# Patient Record
Sex: FEMALE | Race: WHITE | NOT HISPANIC OR LATINO
[De-identification: names, ages, dates, MRNs, and addresses within clinical notes are randomized per-mention and may not be internally consistent; named-entity substitution may affect disease eponyms.]

---

## 2018-01-19 ENCOUNTER — APPOINTMENT (OUTPATIENT)
Dept: OBGYN | Facility: CLINIC | Age: 33
End: 2018-01-19
Payer: COMMERCIAL

## 2018-01-19 VITALS
DIASTOLIC BLOOD PRESSURE: 60 MMHG | BODY MASS INDEX: 38.58 KG/M2 | SYSTOLIC BLOOD PRESSURE: 120 MMHG | HEIGHT: 64 IN | WEIGHT: 226 LBS

## 2018-01-19 DIAGNOSIS — Z30.09 ENCOUNTER FOR OTHER GENERAL COUNSELING AND ADVICE ON CONTRACEPTION: ICD-10-CM

## 2018-01-19 PROCEDURE — 99395 PREV VISIT EST AGE 18-39: CPT

## 2018-01-22 LAB
C TRACH RRNA SPEC QL NAA+PROBE: NOT DETECTED
N GONORRHOEA RRNA SPEC QL NAA+PROBE: NOT DETECTED
SOURCE AMPLIFICATION: NORMAL

## 2018-03-13 ENCOUNTER — RX RENEWAL (OUTPATIENT)
Age: 33
End: 2018-03-13

## 2018-03-13 ENCOUNTER — APPOINTMENT (OUTPATIENT)
Dept: GASTROENTEROLOGY | Facility: CLINIC | Age: 33
End: 2018-03-13
Payer: COMMERCIAL

## 2018-03-13 VITALS
HEIGHT: 64 IN | DIASTOLIC BLOOD PRESSURE: 75 MMHG | RESPIRATION RATE: 17 BRPM | TEMPERATURE: 98.1 F | OXYGEN SATURATION: 100 % | BODY MASS INDEX: 37.56 KG/M2 | WEIGHT: 220 LBS | SYSTOLIC BLOOD PRESSURE: 120 MMHG

## 2018-03-13 DIAGNOSIS — F15.90 OTHER STIMULANT USE, UNSPECIFIED, UNCOMPLICATED: ICD-10-CM

## 2018-03-13 DIAGNOSIS — Z80.0 FAMILY HISTORY OF MALIGNANT NEOPLASM OF DIGESTIVE ORGANS: ICD-10-CM

## 2018-03-13 DIAGNOSIS — K62.5 HEMORRHAGE OF ANUS AND RECTUM: ICD-10-CM

## 2018-03-13 DIAGNOSIS — Z78.9 OTHER SPECIFIED HEALTH STATUS: ICD-10-CM

## 2018-03-13 DIAGNOSIS — Z12.11 ENCOUNTER FOR SCREENING FOR MALIGNANT NEOPLASM OF COLON: ICD-10-CM

## 2018-03-13 PROCEDURE — 99244 OFF/OP CNSLTJ NEW/EST MOD 40: CPT

## 2018-04-04 ENCOUNTER — CHART COPY (OUTPATIENT)
Age: 33
End: 2018-04-04

## 2018-04-11 ENCOUNTER — APPOINTMENT (OUTPATIENT)
Dept: GASTROENTEROLOGY | Facility: AMBULATORY MEDICAL SERVICES | Age: 33
End: 2018-04-11
Payer: COMMERCIAL

## 2018-04-11 PROCEDURE — 43239 EGD BIOPSY SINGLE/MULTIPLE: CPT | Mod: 59

## 2018-04-11 PROCEDURE — 45380 COLONOSCOPY AND BIOPSY: CPT

## 2018-04-24 ENCOUNTER — APPOINTMENT (OUTPATIENT)
Dept: GASTROENTEROLOGY | Facility: CLINIC | Age: 33
End: 2018-04-24
Payer: COMMERCIAL

## 2018-04-24 VITALS
HEART RATE: 68 BPM | SYSTOLIC BLOOD PRESSURE: 120 MMHG | DIASTOLIC BLOOD PRESSURE: 70 MMHG | HEIGHT: 64 IN | TEMPERATURE: 98.7 F | BODY MASS INDEX: 38.41 KG/M2 | WEIGHT: 225 LBS | OXYGEN SATURATION: 99 %

## 2018-04-24 DIAGNOSIS — R10.9 UNSPECIFIED ABDOMINAL PAIN: ICD-10-CM

## 2018-04-24 DIAGNOSIS — K64.9 UNSPECIFIED HEMORRHOIDS: ICD-10-CM

## 2018-04-24 PROCEDURE — 99214 OFFICE O/P EST MOD 30 MIN: CPT

## 2018-04-24 RX ORDER — SODIUM SULFATE, POTASSIUM SULFATE, MAGNESIUM SULFATE 17.5; 3.13; 1.6 G/ML; G/ML; G/ML
17.5-3.13-1.6 SOLUTION, CONCENTRATE ORAL
Qty: 1 | Refills: 0 | Status: DISCONTINUED | COMMUNITY
Start: 2018-03-13 | End: 2018-04-24

## 2018-06-15 ENCOUNTER — APPOINTMENT (OUTPATIENT)
Dept: OBGYN | Facility: CLINIC | Age: 33
End: 2018-06-15
Payer: COMMERCIAL

## 2018-06-15 VITALS
SYSTOLIC BLOOD PRESSURE: 130 MMHG | DIASTOLIC BLOOD PRESSURE: 70 MMHG | BODY MASS INDEX: 40.12 KG/M2 | WEIGHT: 235 LBS | HEIGHT: 64 IN

## 2018-06-15 DIAGNOSIS — Z30.431 ENCOUNTER FOR ROUTINE CHECKING OF INTRAUTERINE CONTRACEPTIVE DEVICE: ICD-10-CM

## 2018-06-15 PROCEDURE — 58301 REMOVE INTRAUTERINE DEVICE: CPT

## 2018-06-15 PROCEDURE — 99213 OFFICE O/P EST LOW 20 MIN: CPT | Mod: 25

## 2018-06-21 LAB — HBA1C MFR BLD HPLC: 5.4 %

## 2019-05-03 ENCOUNTER — APPOINTMENT (OUTPATIENT)
Dept: OBGYN | Facility: CLINIC | Age: 34
End: 2019-05-03
Payer: COMMERCIAL

## 2019-05-03 ENCOUNTER — APPOINTMENT (OUTPATIENT)
Dept: OBGYN | Facility: CLINIC | Age: 34
End: 2019-05-03

## 2019-05-03 VITALS
WEIGHT: 233 LBS | BODY MASS INDEX: 39.78 KG/M2 | DIASTOLIC BLOOD PRESSURE: 70 MMHG | SYSTOLIC BLOOD PRESSURE: 124 MMHG | HEIGHT: 64 IN

## 2019-05-03 DIAGNOSIS — Z30.432 ENCOUNTER FOR REMOVAL OF INTRAUTERINE CONTRACEPTIVE DEVICE: ICD-10-CM

## 2019-05-03 PROCEDURE — 99395 PREV VISIT EST AGE 18-39: CPT

## 2019-05-03 RX ORDER — HYDROCORTISONE ACETATE 25 MG/1
25 SUPPOSITORY RECTAL TWICE DAILY
Qty: 28 | Refills: 2 | Status: DISCONTINUED | COMMUNITY
Start: 2018-04-24 | End: 2019-05-03

## 2019-05-03 RX ORDER — BISMUTH SUBSALICYLATE 525 MG/1
TABLET ORAL
Qty: 30 | Refills: 0 | Status: DISCONTINUED | OUTPATIENT
Start: 2018-04-24 | End: 2019-05-03

## 2019-05-03 RX ORDER — NORETHINDRONE ACETATE AND ETHINYL ESTRADIOL AND FERROUS FUMARATE 1MG-20(21)
1-20 KIT ORAL DAILY
Qty: 1 | Refills: 5 | Status: DISCONTINUED | COMMUNITY
Start: 2018-12-04 | End: 2019-05-03

## 2019-05-03 RX ORDER — LEVONORGESTREL 13.5 MG/1
INTRAUTERINE DEVICE INTRAUTERINE
Refills: 0 | Status: DISCONTINUED | COMMUNITY
End: 2019-05-03

## 2019-05-03 NOTE — PHYSICAL EXAM
[LAD] : no lymphadenopathy [Acute Distress] : no acute distress [Thyroid Nodule] : no thyroid nodule [Goiter] : no goiter [Nipple Discharge] : no nipple discharge [Mass] : no breast mass [Distended] : not distended [Axillary LAD] : no axillary lymphadenopathy [Tender] : non tender [H/Smegaly] : no hepatosplenomegaly [Depressed Mood] : not depressed [Flat Affect] : affect not flat [Discharge] : had no discharge [Pap Obtained] : a Pap smear was not performed [Motion Tenderness] : there was no cervical motion tenderness [Tenderness] : nontender [Enlarged ___ wks] : not enlarged [Mass ___ cm] : no uterine mass was palpated [FreeTextEntry7] : difficult examination due to elevated BMI

## 2019-05-03 NOTE — OB HISTORY
[___] : no pregnancy complications reported [FreeTextEntry1] : Delivery of a viable female infant . Patient had C/S for maternal temp and failure to descend. \par\par ARRON RENDON MD; Sep 17 2015  7:15PM \par \par \par 29-year-old  EDC 9/12/15\par 40-5 weeks\par \par LMP 12/6/14 EDC 9/12/15\par 1/29/15 7-1/7 9/16/15\par 3/5/15 12-6/7 9/11/15\par \par Prenatal Issues\par •	[x] Pertussis vaccination - July 2, 2015\par •	History of asthma\par •	PCN allergy\par •	RhoGAM candidate 6/18/15\par •	Gestational diabetes A1\par Prenatal Labs\par •	TSH 0.61\par •	HIV (-)\par •	Rubella Immune\par •	Varicella Immune\par •	HBSAg (-)\par •	Blood type A (-)\par •	RPR (-)\par •	Electrophoresis (-)\par •	G/C (-) July 2014\par •	Pap (-) July 2013\par •	GBS (-)\par •	Ultrasound\par o	3/23/15: Cervix long and closed\par o	6/18/15: EFW 1146 g, 2 lbs. 8 oz., 56 percentile\par o	8/4/15: EFW 2585 gm; 5-11 lbs; 73%\par •	3rd trimester labs\par o	GTT 72/201/160/123\par o	\par o	HIV(-)\par o	CBC 15.2/11.2/35.4/337\par \par •	Genetic Screening\par o	CF (-)\par o	Fragile X (-)\par o	SMA (-)\par o	Ultrascreen negative> modified sequential negative\par o	Anatomy scan center, cervix long, anatomy normal\par \par \par

## 2019-05-03 NOTE — HISTORY OF PRESENT ILLNESS
[de-identified] : January 2018 [Dull] : no dull [Continuous] : no continuous [Sharp] : no sharp [Stabbing] : no stabbing [Throbbing] : no throbbing [Itching] : no itching [Burning] : no burning [Mass] : no mass [Stinging] : no stinging [Soreness] : no soreness [Lesion] : no lesion [Discharge] : no discharge [Irregular Menses] : normal menses [Prolonged Menses] : menses of normal duration [Mass (___cm)] : no palpable mass [Localized Pain] : no localized pain [Nipple Discharge] : no nipple discharge [Skin Color Change] : no skin color change [Diffused Pain] : no diffused pain [Hot Flashes] : no hot flashes [FreeTextEntry9] : Patient denies breast issues [Vaginal Itching] : no vaginal itching [Night Sweats] : no night sweats [Dyspareunia] : no dyspareunia [Mood Changes] : no mood changes [FreeTextEntry8] : Patient is not menopausal [Spotting Between  Menses] : no spotting between menses [Currently In Menopause] : not currently in menopause [Menstrual Cramps] : no menstrual cramps [Experiencing Menopausal Sxs] : not experiencing menopausal symptoms [de-identified] : Same partner x 2009

## 2019-05-03 NOTE — CHIEF COMPLAINT
[FreeTextEntry1] : \par \par This patient was last seen in June 2018\par Issues discussed\par IUD removal\par VINNIE IUD removed without complication\par Patient to return in 6 months for annual examination\par Patient planning pregnancy within the next 12 months\par She has decided utilize condoms for now\par Return to office if she desires other contraceptive methods\par \par Check Hemoglobin A1c - History of gestational diabetes \par \par \par Health data\par Pap smear negative September 2016\par HPV DNA negative September 2016\par Gonorrhea and Chlamydia negative January 2018

## 2019-05-09 LAB
C TRACH RRNA SPEC QL NAA+PROBE: NOT DETECTED
CYTOLOGY CVX/VAG DOC THIN PREP: NORMAL
HPV HIGH+LOW RISK DNA PNL CVX: NOT DETECTED
SOURCE AMPLIFICATION: NORMAL

## 2020-06-10 ENCOUNTER — NON-APPOINTMENT (OUTPATIENT)
Age: 35
End: 2020-06-10

## 2020-06-10 ENCOUNTER — APPOINTMENT (OUTPATIENT)
Dept: INTERNAL MEDICINE | Facility: CLINIC | Age: 35
End: 2020-06-10
Payer: COMMERCIAL

## 2020-06-10 VITALS
HEART RATE: 84 BPM | BODY MASS INDEX: 36.7 KG/M2 | TEMPERATURE: 97.7 F | WEIGHT: 215 LBS | SYSTOLIC BLOOD PRESSURE: 124 MMHG | DIASTOLIC BLOOD PRESSURE: 81 MMHG | HEIGHT: 64 IN

## 2020-06-10 DIAGNOSIS — Z11.59 ENCOUNTER FOR SCREENING FOR OTHER VIRAL DISEASES: ICD-10-CM

## 2020-06-10 PROCEDURE — 99385 PREV VISIT NEW AGE 18-39: CPT | Mod: 25

## 2020-06-10 PROCEDURE — 36415 COLL VENOUS BLD VENIPUNCTURE: CPT

## 2020-06-10 PROCEDURE — 81003 URINALYSIS AUTO W/O SCOPE: CPT | Mod: QW

## 2020-06-10 PROCEDURE — 93000 ELECTROCARDIOGRAM COMPLETE: CPT

## 2020-06-10 NOTE — PHYSICAL EXAM
[Normal] : normal rate, regular rhythm, normal S1 and S2 and no murmur heard [Pedal Pulses Present] : the pedal pulses are present [No Edema] : there was no peripheral edema [Soft] : abdomen soft [Non Tender] : non-tender [Non-distended] : non-distended [Normal Posterior Cervical Nodes] : no posterior cervical lymphadenopathy [Normal Anterior Cervical Nodes] : no anterior cervical lymphadenopathy [No Joint Swelling] : no joint swelling [Grossly Normal Strength/Tone] : grossly normal strength/tone [No Rash] : no rash [Coordination Grossly Intact] : coordination grossly intact [No Focal Deficits] : no focal deficits [Normal Gait] : normal gait [Normal Affect] : the affect was normal [Normal Mood] : the mood was normal

## 2020-06-10 NOTE — HEALTH RISK ASSESSMENT
[Good] : ~his/her~  mood as  good [] : No [Yes] : Yes [No] : In the past 12 months have you used drugs other than those required for medical reasons? No [0] : 2) Feeling down, depressed, or hopeless: Not at all (0) [de-identified] : social [de-identified] : intermittent.  has small child at home that comes her busy [de-identified] : norml balanced diet  [[id number]] : 0 [Patient reported PAP Smear was normal] : Patient reported PAP Smear was normal [Change in mental status noted] : No change in mental status noted [With Family] : lives with family [# of Members in Household ___] :  household currently consist of [unfilled] member(s) [] :  [Sexually Active] : sexually active [# Of Children ___] : has [unfilled] children [High Risk Behavior] : no high risk behavior [Feels Safe at Home] : Feels safe at home [Fully functional (bathing, dressing, toileting, transferring, walking, feeding)] : Fully functional (bathing, dressing, toileting, transferring, walking, feeding) [Fully functional (using the telephone, shopping, preparing meals, housekeeping, doing laundry, using] : Fully functional and needs no help or supervision to perform IADLs (using the telephone, shopping, preparing meals, housekeeping, doing laundry, using transportation, managing medications and managing finances) [Reports changes in hearing] : Reports no changes in hearing [Reports changes in vision] : Reports no changes in vision [Reports changes in dental health] : Reports no changes in dental health [Smoke Detector] : smoke detector [Carbon Monoxide Detector] : carbon monoxide detector [PapSmearDate] : 2018

## 2020-06-10 NOTE — COUNSELING
[Encouraged to increase physical activity] : Encouraged to increase physical activity [Encouraged to use exercise tracking device] : Encouraged to use exercise tracking device [Decrease Portions] : decrease portions [____ min/wk Activity] : [unfilled] min/wk activity [Good understanding] : Patient has a good understanding of disease, goals and obesity follow-up plan

## 2020-06-10 NOTE — HEALTH RISK ASSESSMENT
[Good] : ~his/her~  mood as  good [] : No [Yes] : Yes [No] : In the past 12 months have you used drugs other than those required for medical reasons? No [0] : 2) Feeling down, depressed, or hopeless: Not at all (0) [de-identified] : social [de-identified] : intermittent.  has small child at home that comes her busy [de-identified] : norml balanced diet  [[id number]] : 0 [Patient reported PAP Smear was normal] : Patient reported PAP Smear was normal [Change in mental status noted] : No change in mental status noted [With Family] : lives with family [# of Members in Household ___] :  household currently consist of [unfilled] member(s) [] :  [# Of Children ___] : has [unfilled] children [Sexually Active] : sexually active [High Risk Behavior] : no high risk behavior [Feels Safe at Home] : Feels safe at home [Fully functional (bathing, dressing, toileting, transferring, walking, feeding)] : Fully functional (bathing, dressing, toileting, transferring, walking, feeding) [Fully functional (using the telephone, shopping, preparing meals, housekeeping, doing laundry, using] : Fully functional and needs no help or supervision to perform IADLs (using the telephone, shopping, preparing meals, housekeeping, doing laundry, using transportation, managing medications and managing finances) [Reports changes in hearing] : Reports no changes in hearing [Reports changes in vision] : Reports no changes in vision [Reports changes in dental health] : Reports no changes in dental health [Smoke Detector] : smoke detector [Carbon Monoxide Detector] : carbon monoxide detector [PapSmearDate] : 2018

## 2020-06-10 NOTE — HISTORY OF PRESENT ILLNESS
[de-identified] : 33 y/o female with h/o asthma presents for annual exam states for the last 2 weeks has been coughing intermittently.  no sputum production, sob, chest pain or palpitations.states maybe due to wearing mask at work all day as she is concerned given her boss was hospitalized with covi19 last month.  her asthma is usually controlled with advair and proair, but has ran out of the medication.  usually gets these symptoms around the change of seasons.  was seen by allergist in the past but never did testing at that time.\par \par \par

## 2020-06-10 NOTE — ASSESSMENT
[FreeTextEntry1] : \par Annual \par -Advised to get yearly Flu shot \par -Advised Yearly Eye exam with Ophthalmologist\par -Advised Yearly Dental exam\par -Educated of the importance of Healthy diet, such as Mediterranean Diet and Exercise, such as walking >20 minutes a day and increasing gradually as tolerated\par \par coughing x 2weeks,  intermittently.  no sputum production, sob, chest pain or palpitations.states maybe due to wearing mask at work all day as she is concerned given her boss was hospitalized with covi19 last month.  her asthma is usually controlled with advair and proair, but has ran out of the medication.  usually gets these symptoms around the change of seasons.  was seen by allergist in the past but never did testing at that time.\par -refill advair and proair\par -add singulair \par -f/u as needed \par

## 2020-06-10 NOTE — HISTORY OF PRESENT ILLNESS
[de-identified] : 33 y/o female with h/o asthma presents for annual exam states for the last 2 weeks has been coughing intermittently.  no sputum production, sob, chest pain or palpitations.states maybe due to wearing mask at work all day as she is concerned given her boss was hospitalized with covi19 last month.  her asthma is usually controlled with advair and proair, but has ran out of the medication.  usually gets these symptoms around the change of seasons.  was seen by allergist in the past but never did testing at that time.\par \par \par

## 2020-06-10 NOTE — PHYSICAL EXAM
[Normal] : normal rate, regular rhythm, normal S1 and S2 and no murmur heard [Pedal Pulses Present] : the pedal pulses are present [No Edema] : there was no peripheral edema [Soft] : abdomen soft [Non Tender] : non-tender [Non-distended] : non-distended [Normal Posterior Cervical Nodes] : no posterior cervical lymphadenopathy [Normal Anterior Cervical Nodes] : no anterior cervical lymphadenopathy [No Joint Swelling] : no joint swelling [Grossly Normal Strength/Tone] : grossly normal strength/tone [No Rash] : no rash [Coordination Grossly Intact] : coordination grossly intact [No Focal Deficits] : no focal deficits [Normal Gait] : normal gait [Normal Mood] : the mood was normal [Normal Affect] : the affect was normal

## 2020-06-12 LAB
25(OH)D3 SERPL-MCNC: 29.5 NG/ML
ALBUMIN SERPL ELPH-MCNC: 4.5 G/DL
ALP BLD-CCNC: 55 U/L
ALT SERPL-CCNC: 11 U/L
ANION GAP SERPL CALC-SCNC: 11 MMOL/L
AST SERPL-CCNC: 14 U/L
BASOPHILS # BLD AUTO: 0.07 K/UL
BASOPHILS NFR BLD AUTO: 0.9 %
BILIRUB DIRECT SERPL-MCNC: 0.1 MG/DL
BILIRUB INDIRECT SERPL-MCNC: 0.2 MG/DL
BILIRUB SERPL-MCNC: 0.3 MG/DL
BILIRUB UR QL STRIP: NORMAL
BUN SERPL-MCNC: 10 MG/DL
CALCIUM SERPL-MCNC: 9.4 MG/DL
CHLORIDE SERPL-SCNC: 104 MMOL/L
CHOLEST SERPL-MCNC: 192 MG/DL
CHOLEST/HDLC SERPL: 4.1 RATIO
CLARITY UR: CLEAR
CO2 SERPL-SCNC: 26 MMOL/L
COLLECTION METHOD: NORMAL
CREAT SERPL-MCNC: 0.72 MG/DL
EOSINOPHIL # BLD AUTO: 0.09 K/UL
EOSINOPHIL NFR BLD AUTO: 1.1 %
ESTIMATED AVERAGE GLUCOSE: 105 MG/DL
FERRITIN SERPL-MCNC: 39 NG/ML
FOLATE SERPL-MCNC: 6.8 NG/ML
GLUCOSE SERPL-MCNC: 108 MG/DL
GLUCOSE UR-MCNC: NORMAL
HBA1C MFR BLD HPLC: 5.3 %
HCG UR QL: 0.2 EU/DL
HCT VFR BLD CALC: 43.5 %
HDLC SERPL-MCNC: 47 MG/DL
HGB BLD-MCNC: 13.5 G/DL
HGB UR QL STRIP.AUTO: NORMAL
IMM GRANULOCYTES NFR BLD AUTO: 0.6 %
IRON SATN MFR SERPL: 28 %
IRON SERPL-MCNC: 110 UG/DL
KETONES UR-MCNC: NORMAL
LDLC SERPL CALC-MCNC: 129 MG/DL
LEUKOCYTE ESTERASE UR QL STRIP: NORMAL
LYMPHOCYTES # BLD AUTO: 2.17 K/UL
LYMPHOCYTES NFR BLD AUTO: 27.5 %
MAN DIFF?: NORMAL
MCHC RBC-ENTMCNC: 27.2 PG
MCHC RBC-ENTMCNC: 31 GM/DL
MCV RBC AUTO: 87.5 FL
MONOCYTES # BLD AUTO: 0.51 K/UL
MONOCYTES NFR BLD AUTO: 6.5 %
NEUTROPHILS # BLD AUTO: 5 K/UL
NEUTROPHILS NFR BLD AUTO: 63.4 %
NITRITE UR QL STRIP: NORMAL
PH UR STRIP: 7.5
PLATELET # BLD AUTO: 326 K/UL
POTASSIUM SERPL-SCNC: 4.8 MMOL/L
PROT SERPL-MCNC: 7.3 G/DL
PROT UR STRIP-MCNC: NORMAL
RBC # BLD: 4.97 M/UL
RBC # FLD: 14.1 %
SARS-COV-2 IGG SERPL IA-ACNC: <3.8 AU/ML
SARS-COV-2 IGG SERPL QL IA: NEGATIVE
SODIUM SERPL-SCNC: 141 MMOL/L
SP GR UR STRIP: 1.02
TIBC SERPL-MCNC: 389 UG/DL
TRIGL SERPL-MCNC: 84 MG/DL
TSH SERPL-ACNC: 1.04 UIU/ML
UIBC SERPL-MCNC: 279 UG/DL
VIT B12 SERPL-MCNC: 535 PG/ML
WBC # FLD AUTO: 7.89 K/UL

## 2020-06-26 ENCOUNTER — NON-APPOINTMENT (OUTPATIENT)
Age: 35
End: 2020-06-26

## 2020-08-31 ENCOUNTER — RX RENEWAL (OUTPATIENT)
Age: 35
End: 2020-08-31

## 2020-09-03 ENCOUNTER — LABORATORY RESULT (OUTPATIENT)
Age: 35
End: 2020-09-03

## 2020-09-03 ENCOUNTER — APPOINTMENT (OUTPATIENT)
Dept: OBGYN | Facility: CLINIC | Age: 35
End: 2020-09-03
Payer: COMMERCIAL

## 2020-09-03 VITALS
BODY MASS INDEX: 38.76 KG/M2 | DIASTOLIC BLOOD PRESSURE: 80 MMHG | HEIGHT: 64 IN | SYSTOLIC BLOOD PRESSURE: 122 MMHG | WEIGHT: 227 LBS

## 2020-09-03 DIAGNOSIS — Z01.419 ENCOUNTER FOR GYNECOLOGICAL EXAMINATION (GENERAL) (ROUTINE) W/OUT ABNORMAL FINDINGS: ICD-10-CM

## 2020-09-03 DIAGNOSIS — Z30.09 ENCOUNTER FOR OTHER GENERAL COUNSELING AND ADVICE ON CONTRACEPTION: ICD-10-CM

## 2020-09-03 DIAGNOSIS — R14.0 ABDOMINAL DISTENSION (GASEOUS): ICD-10-CM

## 2020-09-03 PROCEDURE — 99395 PREV VISIT EST AGE 18-39: CPT

## 2020-09-03 NOTE — COUNSELING
[Breast Self Exam] : breast self exam [Nutrition] : nutrition [Exercise] : exercise [Vitamins/Supplements] : vitamins/supplements [Fertility Options] : fertility options [Contraception] : contraception [Weight Management] : weight management

## 2020-09-22 LAB — CYTOLOGY CVX/VAG DOC THIN PREP: ABNORMAL

## 2020-11-19 LAB — HPV HIGH+LOW RISK DNA PNL CVX: DETECTED

## 2021-03-29 ENCOUNTER — NON-APPOINTMENT (OUTPATIENT)
Age: 36
End: 2021-03-29

## 2021-03-29 ENCOUNTER — APPOINTMENT (OUTPATIENT)
Dept: INTERNAL MEDICINE | Facility: CLINIC | Age: 36
End: 2021-03-29
Payer: COMMERCIAL

## 2021-03-29 VITALS
TEMPERATURE: 97.3 F | SYSTOLIC BLOOD PRESSURE: 116 MMHG | HEIGHT: 64 IN | HEART RATE: 86 BPM | WEIGHT: 236 LBS | DIASTOLIC BLOOD PRESSURE: 78 MMHG | OXYGEN SATURATION: 100 % | BODY MASS INDEX: 40.29 KG/M2

## 2021-03-29 DIAGNOSIS — R63.5 ABNORMAL WEIGHT GAIN: ICD-10-CM

## 2021-03-29 PROCEDURE — 36415 COLL VENOUS BLD VENIPUNCTURE: CPT

## 2021-03-29 PROCEDURE — 93000 ELECTROCARDIOGRAM COMPLETE: CPT

## 2021-03-29 PROCEDURE — 99072 ADDL SUPL MATRL&STAF TM PHE: CPT

## 2021-03-29 PROCEDURE — 99395 PREV VISIT EST AGE 18-39: CPT | Mod: 25

## 2021-03-29 RX ORDER — MONTELUKAST 10 MG/1
10 TABLET, FILM COATED ORAL
Qty: 30 | Refills: 0 | Status: DISCONTINUED | COMMUNITY
Start: 2020-06-10 | End: 2021-03-29

## 2021-03-29 NOTE — ASSESSMENT
[FreeTextEntry1] : 35 year old female presents for annual exam.\par \par Overweight, Main concern today is weight gain despite diet and exercise.  States has invested in [employer] and has been using it daily.  Has tried different diets in the past But Unfortunately got discouraged and has been stress eating at times.  Gained about 15 to 20 pounds in the last year.  Works mostly sedentary.  Has tried orlistat for the last year with no major change.  Was seen by nutritionist,  in the past with no major change\par -contrave as directed \par -nutritionist referral in health system \par -Being overweight increases your risk of health conditions such as heart disease, high blood pressure, type 2 diabetes, and certain types of cancer. It can also increase your risk for osteoarthritis, sleep apnea, and other respiratory problems. Aim for a slow, steady weight loss. Even a small amount of weight loss can lower your risk of health problems.\par -A safe and healthy way to lose weight is to eat fewer calories and get regular exercise. You can lose up about 1 pound a week by decreasing the number of calories you eat by 500 calories each day. You can decrease calories by eating smaller portion sizes or by cutting out high-calorie foods. Read labels to find out how many calories are in the foods you eat. You can also burn calories with exercise such as walking, swimming, or biking. You will be more likely to keep weight off if you make these changes part of your lifestyle.\par -Exercise at least 30 minutes per day on most days of the week. Some examples of exercise include walking, biking, dancing, and swimming. You can also fit in more physical activity by taking the stairs instead of the elevator or parking farther away from stores.  \par \par Annual \par -Advised to get yearly Flu shot \par -Advised Yearly Eye exam with Ophthalmologist\par -Advised Yearly Dental exam\par -Educated of the importance of Healthy diet, such as Mediterranean Diet and Exercise, such as walking >20 minutes a day and increasing gradually as tolerated\par \par Preventative screening \par -advised to get PAP for cervical cancer screening -UTD\par \par \par

## 2021-03-29 NOTE — HISTORY OF PRESENT ILLNESS
[de-identified] : 35 year old female presents for annual exam.\par \par having some coughing for the last several days, mostly just when she takes a deep breath then.\par \par Main concern today is weight gain despite diet and exercise.  States has invested in [place] and has been using it daily.  Has tried different diets in the past But Unfortunately got discouraged and has been stress eating at times.  Gained about 15 to 20 pounds in the last year.  Works mostly sedentary.  Has tried orlistat for the last year with no major change.  Was seen by nutritionist,  in the past with no major change\par \par \par \par

## 2021-03-31 LAB
25(OH)D3 SERPL-MCNC: 33.7 NG/ML
ALBUMIN SERPL ELPH-MCNC: 4.4 G/DL
ALP BLD-CCNC: 70 U/L
ALT SERPL-CCNC: 16 U/L
ANION GAP SERPL CALC-SCNC: 11 MMOL/L
APPEARANCE: CLEAR
AST SERPL-CCNC: 14 U/L
BACTERIA: ABNORMAL
BASOPHILS # BLD AUTO: 0.07 K/UL
BASOPHILS NFR BLD AUTO: 0.9 %
BILIRUB DIRECT SERPL-MCNC: 0.1 MG/DL
BILIRUB INDIRECT SERPL-MCNC: 0.2 MG/DL
BILIRUB SERPL-MCNC: 0.2 MG/DL
BILIRUBIN URINE: NEGATIVE
BLOOD URINE: ABNORMAL
BUN SERPL-MCNC: 16 MG/DL
CALCIUM SERPL-MCNC: 9.3 MG/DL
CHLORIDE SERPL-SCNC: 104 MMOL/L
CHOLEST SERPL-MCNC: 205 MG/DL
CO2 SERPL-SCNC: 24 MMOL/L
COLOR: NORMAL
CREAT SERPL-MCNC: 0.75 MG/DL
EOSINOPHIL # BLD AUTO: 0.13 K/UL
EOSINOPHIL NFR BLD AUTO: 1.7 %
ESTIMATED AVERAGE GLUCOSE: 108 MG/DL
FERRITIN SERPL-MCNC: 51 NG/ML
FOLATE SERPL-MCNC: 5.3 NG/ML
GLUCOSE QUALITATIVE U: NEGATIVE
GLUCOSE SERPL-MCNC: 84 MG/DL
HBA1C MFR BLD HPLC: 5.4 %
HCT VFR BLD CALC: 44.8 %
HDLC SERPL-MCNC: 38 MG/DL
HGB BLD-MCNC: 13.6 G/DL
HYALINE CASTS: 2 /LPF
IMM GRANULOCYTES NFR BLD AUTO: 0.4 %
IRON SATN MFR SERPL: 13 %
IRON SERPL-MCNC: 45 UG/DL
KETONES URINE: NEGATIVE
LDLC SERPL CALC-MCNC: 144 MG/DL
LEUKOCYTE ESTERASE URINE: NEGATIVE
LYMPHOCYTES # BLD AUTO: 2.41 K/UL
LYMPHOCYTES NFR BLD AUTO: 31 %
MAN DIFF?: NORMAL
MCHC RBC-ENTMCNC: 27.7 PG
MCHC RBC-ENTMCNC: 30.4 GM/DL
MCV RBC AUTO: 91.2 FL
MICROSCOPIC-UA: NORMAL
MONOCYTES # BLD AUTO: 0.58 K/UL
MONOCYTES NFR BLD AUTO: 7.5 %
NEUTROPHILS # BLD AUTO: 4.55 K/UL
NEUTROPHILS NFR BLD AUTO: 58.5 %
NITRITE URINE: NEGATIVE
NONHDLC SERPL-MCNC: 167 MG/DL
PH URINE: 5.5
PLATELET # BLD AUTO: 376 K/UL
POTASSIUM SERPL-SCNC: 4.8 MMOL/L
PROT SERPL-MCNC: 7.2 G/DL
PROTEIN URINE: NEGATIVE
RBC # BLD: 4.91 M/UL
RBC # FLD: 13.3 %
RED BLOOD CELLS URINE: 3 /HPF
SAVE SPECIMEN: NORMAL
SODIUM SERPL-SCNC: 139 MMOL/L
SPECIFIC GRAVITY URINE: 1.02
SQUAMOUS EPITHELIAL CELLS: 10 /HPF
T3FREE SERPL-MCNC: 2.76 PG/ML
T4 FREE SERPL-MCNC: 0.9 NG/DL
TIBC SERPL-MCNC: 350 UG/DL
TRIGL SERPL-MCNC: 115 MG/DL
TSH SERPL-ACNC: 1.59 UIU/ML
UIBC SERPL-MCNC: 304 UG/DL
UROBILINOGEN URINE: NORMAL
VIT B12 SERPL-MCNC: 536 PG/ML
WBC # FLD AUTO: 7.77 K/UL
WHITE BLOOD CELLS URINE: 3 /HPF

## 2021-04-06 ENCOUNTER — TRANSCRIPTION ENCOUNTER (OUTPATIENT)
Age: 36
End: 2021-04-06

## 2021-04-13 ENCOUNTER — NON-APPOINTMENT (OUTPATIENT)
Age: 36
End: 2021-04-13

## 2021-04-19 ENCOUNTER — NON-APPOINTMENT (OUTPATIENT)
Age: 36
End: 2021-04-19

## 2021-04-20 ENCOUNTER — APPOINTMENT (OUTPATIENT)
Dept: CHRONIC DISEASE MANAGEMENT | Facility: CLINIC | Age: 36
End: 2021-04-20

## 2021-04-20 ENCOUNTER — NON-APPOINTMENT (OUTPATIENT)
Age: 36
End: 2021-04-20

## 2021-04-21 VITALS — BODY MASS INDEX: 40.89 KG/M2 | WEIGHT: 238.19 LBS

## 2021-07-06 RX ORDER — NORETHINDRONE ACETATE AND ETHINYL ESTRADIOL AND FERROUS FUMARATE 1MG-20(21)
1-20 KIT ORAL
Qty: 2 | Refills: 0 | Status: ACTIVE | COMMUNITY
Start: 2021-07-06 | End: 1900-01-01

## 2021-07-06 RX ORDER — NORETHINDRONE ACETATE AND ETHINYL ESTRADIOL AND FERROUS FUMARATE 1MG-20(21)
1-20 KIT ORAL DAILY
Qty: 2 | Refills: 0 | Status: DISCONTINUED | COMMUNITY
Start: 2019-05-03

## 2021-08-02 ENCOUNTER — OUTPATIENT (OUTPATIENT)
Dept: OUTPATIENT SERVICES | Facility: HOSPITAL | Age: 36
LOS: 1 days | End: 2021-08-02
Payer: COMMERCIAL

## 2021-08-02 ENCOUNTER — RESULT REVIEW (OUTPATIENT)
Age: 36
End: 2021-08-02

## 2021-08-02 ENCOUNTER — APPOINTMENT (OUTPATIENT)
Dept: RADIOLOGY | Facility: CLINIC | Age: 36
End: 2021-08-02
Payer: COMMERCIAL

## 2021-08-02 DIAGNOSIS — S99.922A UNSPECIFIED INJURY OF LEFT FOOT, INITIAL ENCOUNTER: ICD-10-CM

## 2021-08-02 PROCEDURE — 73630 X-RAY EXAM OF FOOT: CPT | Mod: 26,LT

## 2021-08-02 PROCEDURE — 73630 X-RAY EXAM OF FOOT: CPT

## 2021-08-31 ENCOUNTER — RX RENEWAL (OUTPATIENT)
Age: 36
End: 2021-08-31

## 2021-09-16 ENCOUNTER — APPOINTMENT (OUTPATIENT)
Dept: OBGYN | Facility: CLINIC | Age: 36
End: 2021-09-16
Payer: COMMERCIAL

## 2021-09-16 ENCOUNTER — LABORATORY RESULT (OUTPATIENT)
Age: 36
End: 2021-09-16

## 2021-09-16 VITALS — DIASTOLIC BLOOD PRESSURE: 64 MMHG | WEIGHT: 244 LBS | BODY MASS INDEX: 41.88 KG/M2 | SYSTOLIC BLOOD PRESSURE: 110 MMHG

## 2021-09-16 PROCEDURE — 99395 PREV VISIT EST AGE 18-39: CPT

## 2021-09-16 NOTE — PHYSICAL EXAM
[Appropriately responsive] : appropriately responsive [Alert] : alert [No Acute Distress] : no acute distress [No Lymphadenopathy] : no lymphadenopathy [Regular Rate Rhythm] : regular rate rhythm [No Murmurs] : no murmurs [Clear to Auscultation B/L] : clear to auscultation bilaterally [Soft] : soft [Non-tender] : non-tender [Non-distended] : non-distended [No HSM] : No HSM [No Lesions] : no lesions [No Mass] : no mass [Oriented x3] : oriented x3 [Examination Of The Breasts] : a normal appearance [No Masses] : no breast masses were palpable [Labia Majora] : normal [Labia Minora] : normal [Polyp ___ cm] : [unfilled] ~[facility] polyp [Normal] : normal [Uterine Adnexae] : normal

## 2021-09-16 NOTE — PLAN
[FreeTextEntry1] : annual exam\par \par \par cervical vs garrett polyp\par RTO for polyp removal vs discuss further polypectomy in OR\par \par advised covid vaccine now or at least prior to preg\par \par stressed wt loss\par seeing workout

## 2021-09-21 LAB
CYTOLOGY CVX/VAG DOC THIN PREP: ABNORMAL
HPV HIGH+LOW RISK DNA PNL CVX: DETECTED

## 2021-09-22 ENCOUNTER — OUTPATIENT (OUTPATIENT)
Dept: OUTPATIENT SERVICES | Facility: HOSPITAL | Age: 36
LOS: 1 days | End: 2021-09-22
Payer: COMMERCIAL

## 2021-09-22 ENCOUNTER — APPOINTMENT (OUTPATIENT)
Dept: ULTRASOUND IMAGING | Facility: CLINIC | Age: 36
End: 2021-09-22
Payer: COMMERCIAL

## 2021-09-22 ENCOUNTER — RESULT REVIEW (OUTPATIENT)
Age: 36
End: 2021-09-22

## 2021-09-22 DIAGNOSIS — Z00.8 ENCOUNTER FOR OTHER GENERAL EXAMINATION: ICD-10-CM

## 2021-09-22 DIAGNOSIS — N84.0 POLYP OF CORPUS UTERI: ICD-10-CM

## 2021-09-22 PROCEDURE — 76856 US EXAM PELVIC COMPLETE: CPT

## 2021-09-22 PROCEDURE — 76830 TRANSVAGINAL US NON-OB: CPT

## 2021-09-22 PROCEDURE — 76830 TRANSVAGINAL US NON-OB: CPT | Mod: 26

## 2021-09-22 PROCEDURE — 76856 US EXAM PELVIC COMPLETE: CPT | Mod: 26

## 2021-09-27 ENCOUNTER — APPOINTMENT (OUTPATIENT)
Dept: OBGYN | Facility: CLINIC | Age: 36
End: 2021-09-27
Payer: COMMERCIAL

## 2021-09-27 VITALS — BODY MASS INDEX: 41.88 KG/M2 | SYSTOLIC BLOOD PRESSURE: 100 MMHG | WEIGHT: 244 LBS | DIASTOLIC BLOOD PRESSURE: 74 MMHG

## 2021-09-27 PROCEDURE — 57500 BIOPSY OF CERVIX: CPT

## 2021-09-27 NOTE — PLAN
[FreeTextEntry1] : uncomplicated polyp removal, small stump remnant \par \par b/l ovarian cysts\par \par repeat in 6-8 wks\par \par LMP july, likely related to cysts\par will observe

## 2021-09-27 NOTE — PROCEDURE
[Time out performed] : Pre-procedure time out performed.  Patient's name, date of birth and procedure confirmed. [Consent Obtained] : Consent obtained [Patient] : patient [Infection] : infection [Pain] : pain [Betadine] : Betadine [None] : none [Ring Forceps] : Ring forceps [Easy Passage] : Easy passage [Sent to Pathology] : placed in buffered formalin and sent for pathology [Tolerated Well] : Patient tolerated the procedure well [No Complications] : No complications [Bleeding] : excessive bleeding was noted [de-identified] : cervical polyp [de-identified] : cervix polyp grasped   w forceps and removed

## 2021-10-22 ENCOUNTER — EMERGENCY (EMERGENCY)
Facility: HOSPITAL | Age: 36
LOS: 1 days | Discharge: HOME | End: 2021-10-22
Attending: EMERGENCY MEDICINE
Payer: COMMERCIAL

## 2021-10-22 ENCOUNTER — NON-APPOINTMENT (OUTPATIENT)
Age: 36
End: 2021-10-22

## 2021-10-22 VITALS
HEIGHT: 65 IN | OXYGEN SATURATION: 99 % | TEMPERATURE: 98 F | HEART RATE: 73 BPM | SYSTOLIC BLOOD PRESSURE: 131 MMHG | RESPIRATION RATE: 22 BRPM | DIASTOLIC BLOOD PRESSURE: 70 MMHG | WEIGHT: 229.94 LBS

## 2021-10-22 VITALS
SYSTOLIC BLOOD PRESSURE: 106 MMHG | OXYGEN SATURATION: 99 % | RESPIRATION RATE: 18 BRPM | DIASTOLIC BLOOD PRESSURE: 56 MMHG | HEART RATE: 67 BPM

## 2021-10-22 DIAGNOSIS — Z98.891 HISTORY OF UTERINE SCAR FROM PREVIOUS SURGERY: Chronic | ICD-10-CM

## 2021-10-22 LAB
ALBUMIN SERPL ELPH-MCNC: 4.5 G/DL — SIGNIFICANT CHANGE UP (ref 3.3–5)
ALP SERPL-CCNC: 60 U/L — SIGNIFICANT CHANGE UP (ref 40–120)
ALT FLD-CCNC: 13 U/L — SIGNIFICANT CHANGE UP (ref 10–45)
ANION GAP SERPL CALC-SCNC: 16 MMOL/L — SIGNIFICANT CHANGE UP (ref 5–17)
APPEARANCE UR: CLEAR — SIGNIFICANT CHANGE UP
APTT BLD: 29.9 SEC — SIGNIFICANT CHANGE UP (ref 27.5–35.5)
AST SERPL-CCNC: 14 U/L — SIGNIFICANT CHANGE UP (ref 10–40)
BACTERIA # UR AUTO: ABNORMAL
BILIRUB SERPL-MCNC: 0.2 MG/DL — SIGNIFICANT CHANGE UP (ref 0.2–1.2)
BILIRUB UR-MCNC: NEGATIVE — SIGNIFICANT CHANGE UP
BLD GP AB SCN SERPL QL: NEGATIVE — SIGNIFICANT CHANGE UP
BUN SERPL-MCNC: 9 MG/DL — SIGNIFICANT CHANGE UP (ref 7–23)
CALCIUM SERPL-MCNC: 9.1 MG/DL — SIGNIFICANT CHANGE UP (ref 8.4–10.5)
CHLORIDE SERPL-SCNC: 103 MMOL/L — SIGNIFICANT CHANGE UP (ref 96–108)
CO2 SERPL-SCNC: 21 MMOL/L — LOW (ref 22–31)
COLOR SPEC: YELLOW — SIGNIFICANT CHANGE UP
CORE LAB BIOPSY: NORMAL
CREAT SERPL-MCNC: 0.7 MG/DL — SIGNIFICANT CHANGE UP (ref 0.5–1.3)
DIFF PNL FLD: ABNORMAL
EPI CELLS # UR: 3 /HPF — SIGNIFICANT CHANGE UP
GLUCOSE SERPL-MCNC: 111 MG/DL — HIGH (ref 70–99)
GLUCOSE UR QL: NEGATIVE — SIGNIFICANT CHANGE UP
HCG SERPL-ACNC: <2 MIU/ML — SIGNIFICANT CHANGE UP
HCT VFR BLD CALC: 41.7 % — SIGNIFICANT CHANGE UP (ref 34.5–45)
HGB BLD-MCNC: 13.2 G/DL — SIGNIFICANT CHANGE UP (ref 11.5–15.5)
HYALINE CASTS # UR AUTO: 0 /LPF — SIGNIFICANT CHANGE UP (ref 0–2)
INR BLD: 1.07 RATIO — SIGNIFICANT CHANGE UP (ref 0.88–1.16)
KETONES UR-MCNC: NEGATIVE — SIGNIFICANT CHANGE UP
LEUKOCYTE ESTERASE UR-ACNC: NEGATIVE — SIGNIFICANT CHANGE UP
MCHC RBC-ENTMCNC: 27.4 PG — SIGNIFICANT CHANGE UP (ref 27–34)
MCHC RBC-ENTMCNC: 31.7 GM/DL — LOW (ref 32–36)
MCV RBC AUTO: 86.7 FL — SIGNIFICANT CHANGE UP (ref 80–100)
NITRITE UR-MCNC: NEGATIVE — SIGNIFICANT CHANGE UP
NRBC # BLD: 0 /100 WBCS — SIGNIFICANT CHANGE UP (ref 0–0)
PH UR: 7 — SIGNIFICANT CHANGE UP (ref 5–8)
PLATELET # BLD AUTO: 380 K/UL — SIGNIFICANT CHANGE UP (ref 150–400)
POTASSIUM SERPL-MCNC: 4.1 MMOL/L — SIGNIFICANT CHANGE UP (ref 3.5–5.3)
POTASSIUM SERPL-SCNC: 4.1 MMOL/L — SIGNIFICANT CHANGE UP (ref 3.5–5.3)
PROT SERPL-MCNC: 7.7 G/DL — SIGNIFICANT CHANGE UP (ref 6–8.3)
PROT UR-MCNC: ABNORMAL
PROTHROM AB SERPL-ACNC: 12.8 SEC — SIGNIFICANT CHANGE UP (ref 10.6–13.6)
RBC # BLD: 4.81 M/UL — SIGNIFICANT CHANGE UP (ref 3.8–5.2)
RBC # FLD: 13.2 % — SIGNIFICANT CHANGE UP (ref 10.3–14.5)
RBC CASTS # UR COMP ASSIST: 1 /HPF — SIGNIFICANT CHANGE UP (ref 0–4)
RH IG SCN BLD-IMP: NEGATIVE — SIGNIFICANT CHANGE UP
SARS-COV-2 RNA SPEC QL NAA+PROBE: SIGNIFICANT CHANGE UP
SODIUM SERPL-SCNC: 140 MMOL/L — SIGNIFICANT CHANGE UP (ref 135–145)
SP GR SPEC: 1.03 — HIGH (ref 1.01–1.02)
UROBILINOGEN FLD QL: NEGATIVE — SIGNIFICANT CHANGE UP
WBC # BLD: 16.41 K/UL — HIGH (ref 3.8–10.5)
WBC # FLD AUTO: 16.41 K/UL — HIGH (ref 3.8–10.5)
WBC UR QL: 4 /HPF — SIGNIFICANT CHANGE UP (ref 0–5)

## 2021-10-22 PROCEDURE — 85610 PROTHROMBIN TIME: CPT

## 2021-10-22 PROCEDURE — 86901 BLOOD TYPING SEROLOGIC RH(D): CPT

## 2021-10-22 PROCEDURE — 96375 TX/PRO/DX INJ NEW DRUG ADDON: CPT

## 2021-10-22 PROCEDURE — 85730 THROMBOPLASTIN TIME PARTIAL: CPT

## 2021-10-22 PROCEDURE — 80053 COMPREHEN METABOLIC PANEL: CPT

## 2021-10-22 PROCEDURE — 76830 TRANSVAGINAL US NON-OB: CPT

## 2021-10-22 PROCEDURE — 81001 URINALYSIS AUTO W/SCOPE: CPT

## 2021-10-22 PROCEDURE — U0003: CPT

## 2021-10-22 PROCEDURE — 76830 TRANSVAGINAL US NON-OB: CPT | Mod: 26

## 2021-10-22 PROCEDURE — 99284 EMERGENCY DEPT VISIT MOD MDM: CPT | Mod: 25

## 2021-10-22 PROCEDURE — 84702 CHORIONIC GONADOTROPIN TEST: CPT

## 2021-10-22 PROCEDURE — 85027 COMPLETE CBC AUTOMATED: CPT

## 2021-10-22 PROCEDURE — 93975 VASCULAR STUDY: CPT | Mod: 26

## 2021-10-22 PROCEDURE — 36415 COLL VENOUS BLD VENIPUNCTURE: CPT

## 2021-10-22 PROCEDURE — 86922 COMPATIBILITY TEST ANTIGLOB: CPT

## 2021-10-22 PROCEDURE — 96374 THER/PROPH/DIAG INJ IV PUSH: CPT

## 2021-10-22 PROCEDURE — 99285 EMERGENCY DEPT VISIT HI MDM: CPT

## 2021-10-22 PROCEDURE — 93975 VASCULAR STUDY: CPT

## 2021-10-22 PROCEDURE — 86900 BLOOD TYPING SEROLOGIC ABO: CPT

## 2021-10-22 PROCEDURE — 86850 RBC ANTIBODY SCREEN: CPT

## 2021-10-22 RX ORDER — SODIUM CHLORIDE 9 MG/ML
1000 INJECTION INTRAMUSCULAR; INTRAVENOUS; SUBCUTANEOUS ONCE
Refills: 0 | Status: COMPLETED | OUTPATIENT
Start: 2021-10-22 | End: 2021-10-22

## 2021-10-22 RX ORDER — ONDANSETRON 8 MG/1
8 TABLET, FILM COATED ORAL ONCE
Refills: 0 | Status: COMPLETED | OUTPATIENT
Start: 2021-10-22 | End: 2021-10-22

## 2021-10-22 RX ORDER — HYDROMORPHONE HYDROCHLORIDE 2 MG/ML
1 INJECTION INTRAMUSCULAR; INTRAVENOUS; SUBCUTANEOUS ONCE
Refills: 0 | Status: DISCONTINUED | OUTPATIENT
Start: 2021-10-22 | End: 2021-10-22

## 2021-10-22 RX ADMIN — ONDANSETRON 8 MILLIGRAM(S): 8 TABLET, FILM COATED ORAL at 14:47

## 2021-10-22 RX ADMIN — SODIUM CHLORIDE 1000 MILLILITER(S): 9 INJECTION INTRAMUSCULAR; INTRAVENOUS; SUBCUTANEOUS at 19:32

## 2021-10-22 RX ADMIN — HYDROMORPHONE HYDROCHLORIDE 1 MILLIGRAM(S): 2 INJECTION INTRAMUSCULAR; INTRAVENOUS; SUBCUTANEOUS at 14:47

## 2021-10-22 NOTE — ED PROVIDER NOTE - PHYSICAL EXAMINATION
· Physical Examination: PHYSICAL EXAM:   · CONSTITUTIONAL:  Appearance: well appearing.  Development: well developed.  Distress: no apparent  · Manner: appropriate for situation.  Mentation: awake, alert, oriented to person, place, time/situation  · Mood: appropriate.  Nourishment: well  · Head Shape: normal cephalic, ATRAUMATIC  · EYES: bilateral normal, no discharge, redness or evidence of any abnormality  · Nose: clear Mouth: normal mucosa  · Throat: uvula midline, no vesicles, no redness, and no oropharyngeal exudate.  · CARDIAC:  CARDIAC RHYTHM: regular  CARDIAC RATE: normal  CARDIAC PEDAL EDEMA: absent  CARDIAC JVD: non-distended bilaterally  · CARDIAC PULSES: normal bilaterally  · RESPIRATORY:  Respiratory Distress: no  Breath Sounds: normal  · Chest Exam: normal, non-tender  · Abdominal Exam: soft, nondistended, nontender, however pt in significant amount of LLQ pain without tenderness to that area  · MUSCULOSKELETAL: Spine appears normal, range of motion is not limited, no muscle or joint tenderness  · NEUROLOGICAL: Alert and oriented, no focal deficits, no motor or sensory deficits.  · SKIN: Skin normal color for race, warm, dry and intact. No evidence of rash.  · PSYCHIATRIC: Alert and oriented to person, place, time/situation. normal mood and affect. no apparent risk to self or others.  · HEME LYMPH: No adenopathy or splenomegaly. No cervical or inguinal lymphadenopathy.

## 2021-10-22 NOTE — ED PROVIDER NOTE - CLINICAL SUMMARY MEDICAL DECISION MAKING FREE TEXT BOX
Patient in significant amount of LLQ pain upon arrival.  Pain improved with 1mg Dilaudid but not resolved. Emergently called u/s to receive an immediate doppler to r/o ovarian torsion. Will also send B-HCG and asked for transvaginal u/s of uterus to look for IUP while she is in u/S even if bHCG has not returned and u/s tech agreed. Patient with history of nabothian cyst, uterine polyp, and ovarian cyst.  Patient signed-out to Dr. Jefferson to f/u u/s and consult OB if indicated as emergent diagnosis of ovarian torsion is being ruled out.

## 2021-10-22 NOTE — ED PROVIDER NOTE - NS ED ROS FT
Review of Systems:  · Constitutional: no chills, no fever, no night sweats, no weight loss  · Nose: no epitaxis  · Mouth/Throat: no difficulty in swallowing, trachea midline, uvula midline  · Respiratory: no cough, no exertional dyspnea, no hemoptysis, no orthopnea, no shortness of breath  · Gastrointestinal: LLQ abdominal pain, no diarrhea, no melena, no nausea, no vomiting  · Genitourinary: no difficulty urinating, no dysuria, no hematuria  · MUSCULOSKELETAL: FROM of all extremities  · Skin: no abrasion; no bruising; no laceration  · Neurological: no change in level of consciousness, no headache, no seizures  · Psychiatric: no anxiety, no depression  · Endocrine: no excessive urination  · Heme/Lymph: no anemia, no easy bleeding  · Allergic/Immunologic: IMMUNIZATIONS UTD  · ROS STATEMENT: all other ROS negative except as per HPI

## 2021-10-22 NOTE — ED ADULT NURSE NOTE - NS_ED_NURSE_TEACHING_TOPIC_ED_A_ED
"     [facility]     Date: 6/10/2019    Moses Whittaker  [address] [address]    Dear Mr. Whittaker,    Thank you for talking with me on 6/10/19 about your health and medications. Medicare s MTM (Medication Therapy Management) program helps you understand your medications and use them safely.      This letter includes an action plan (Medication Action Plan) and medication list (Personal Medication List). The action plan has steps you should take to help you get the best results from your medications. The medication list will help you keep track of your medications and how to use them the right way.       Have your action plan and medication list with you when you talk with your doctors, pharmacists, and other healthcare providers in your care team.     Ask your doctors, pharmacists, and other healthcare providers to update the action plan and medication list at every visit.     Take your medication list with you if you go to the hospital or emergency room.     Give a copy of the action plan and medication list to your family or caregivers.     If you want to talk about this letter or any of the papers with it, please call   [phone].   We look forward to working with you, your doctors, and other healthcare providers to help you stay healthy.     Sincerely,    Silvio Jaramillo      Enclosed: Medication Action Plan and Personal Medication List    MEDICATION ACTION PLAN FOR Moses Whittaker,  1952     This action plan will help you get the best results from your medications if you:   1. Read \"What we talked about.\"   2. Take the steps listed in the \"What I need to do\" boxes.   3. Fill in \"What I did and when I did it.\"   4. Fill in \"My follow-up plan\" and \"Questions I want to ask.\"     Have this action plan with you when you talk with your doctors, pharmacists, and other healthcare providers in your care team. Share this with your family or caregivers too.  DATE " PREPARED: 6/10/2019  What we talked about: You stated that you were having ongoing issues with swelling in your legs. During this part of our conversation you stated that you have not been taking your diuretic (furosemide).                                                    What I need to do: You would benefit from restarting at least a low dose of furosemide (40 mg) daily to help your body help pull off excess fluid.  This may reduce the need to use your compression stockings as frequently, but will also help with controlling your lower limb swelling. What I did and when I did it:                                              My follow-up plan:                 Questions I want to ask:              If you have any questions about your action plan, call Silvio Jaramillo, Phone: [phone] , Monday-Friday 8-4:30pm.           MEDICATION LIST FOR Moses WhittakerMARQUITA 1952     This medication list was made for you after we talked. We also used information from your doctor's chart.      Use blank rows to add new medications. Then fill in the dates you started using them.    Cross out medications when you no longer use them. Then write the date and why you stopped using them.    Ask your doctors, pharmacists, and other healthcare providers to update this list at every visit. Keep this list up-to-date with:       Prescription medications    Over the counter drugs     Herbals    Vitamins    Minerals      If you go to the hospital or emergency room, take this list with you. Share this with your family or caregivers too.     DATE PREPARED: 6/10/2019  Allergies or side effects: Famotidine; Gabapentin; Hydrocortisone; Atorvastatin; Celebrex [celecoxib]; Lisinopril; No clinical screening - see comments; Norvasc [amlodipine]; Pregabalin; Valdecoxib; and Insulin aspart     Medication:  ASPIRIN 325 MG PO TABS      How I use it:  Take 325 mg by mouth daily      Why I use it:  Heart disease    Prescriber:  Patient Reported       Date I started using it:       Date I stopped using it:         Why I stopped using it:            Medication:  CLOPIDOGREL BISULFATE 75 MG PO TABS      How I use it:  Take 1 tablet (75 mg) by mouth daily      Why I use it: Heart disease    Prescriber:  Jorge Barnett MD      Date I started using it:       Date I stopped using it:         Why I stopped using it:            Medication:  COENZYME Q10 100 MG PO CAPS      How I use it:  Take 100 mg by mouth daily       Why I use it:  General health    Prescriber:  Patient Reported      Date I started using it:       Date I stopped using it:         Why I stopped using it:            Medication:  DESOXIMETASONE 0.25 % EX CREA      How I use it:  APPLY  CREAM EXTERNALLY TWICE DAILY      Why I use it: Skin rash    Prescriber:  Jorge Barnett MD      Date I started using it:       Date I stopped using it:         Why I stopped using it:            Medication:  DEXTROAMPHETAMINE SULFATE 10 MG PO TABS      How I use it:  Take 1 tablet (10 mg) by mouth 4 times daily      Why I use it: Primary narcolepsy     Prescriber:  Jorge Barnett MD      Date I started using it:       Date I stopped using it:         Why I stopped using it:            Medication:  DICLOFENAC SODIUM 75 MG PO TBEC      How I use it:  TAKE ONE TABLET BY MOUTH 4 TIMES DAILY      Why I use it: Arthritis    Prescriber:  Jorge Barnett MD      Date I started using it:       Date I stopped using it:         Why I stopped using it:            Medication:  DOCUSATE SODIUM 100 MG PO CAPS      How I use it:  Take 1-2 capsules by mouth 2 times daily as needed for constipation prevention      Why I use it:  Constipation    Prescriber:  Patient Reported      Date I started using it:       Date I stopped using it:         Why I stopped using it:            Medication:  FLAXSEED OIL 1000 MG PO CAPS      How I use it:  Take 1 capsule by mouth daily       Why I use it:  General Health    Prescriber:  Patient Reported       Date I started using it:       Date I stopped using it:         Why I stopped using it:            Medication:  FUROSEMIDE 40 MG PO TABS      How I use it:  Take 2-4 tablets ( mg) by mouth daily Or as instructed for leg swelling      Why I use it: Swelling    Prescriber:  Jorge Barnett MD      Date I started using it:       Date I stopped using it:         Why I stopped using it:            Medication:  HUMALOG VIAL VIAL 100 UNIT/ML SC SOLN      How I use it:  INJECT UNDER THE SKIN USING INSULIN PUMP. MAX DAILY DOSE  UNITS      Why I use it: Type 2 diabetes    Prescriber:  Jorge Barnett MD      Date I started using it:       Date I stopped using it:         Why I stopped using it:            Medication:  HYDRALAZINE HCL 25 MG PO TABS      How I use it:  Take 1-2 tablets (25-50 mg) by mouth 4 times daily - Take lowest effective dose for blood pressure management      Why I use it: Blood pressure    Prescriber:  Jorge Barnett MD      Date I started using it:       Date I stopped using it:         Why I stopped using it:            Medication:  HYDROCODONE-ACETAMINOPHEN 5-325 MG PO TABS      How I use it:  Take 1 tablet by mouth every 6 hours as needed for severe pain      Why I use it: Low back pain    Prescriber:  Jorge Barnett MD      Date I started using it:       Date I stopped using it:         Why I stopped using it:            Medication:  IRBESARTAN 150 MG PO TABS      How I use it:  Take 1 tablet (150 mg) by mouth 2 times daily      Why I use it:  Blood pressure    Prescriber:  Jorge Barnett MD      Date I started using it:       Date I stopped using it:         Why I stopped using it:            Medication:  LEVOTHYROXINE SODIUM 125 MCG PO TABS      How I use it:  TAKE 1 TABLET BY MOUTH ONCE DAILY IN THE MORNING      Why I use it: Hypothyroidism    Prescriber:  Jorge Barnett MD      Date I started using it:       Date I stopped using it:         Why I stopped using it:             Medication:  METHYLPHENIDATE HCL 10 MG PO TABS      How I use it:  Take 1 tablet (10 mg) by mouth 4 times daily      Why I use it: Primary narcolepsy     Prescriber:  Jorge Barnett MD      Date I started using it:       Date I stopped using it:         Why I stopped using it:            Medication:  METOPROLOL SUCCINATE ER 50 MG PO TB24      How I use it:  Take 1 tablet (50 mg) by mouth 2 times daily      Why I use it: Blood pressure    Prescriber:  Jorge Barnett MD      Date I started using it:       Date I stopped using it:         Why I stopped using it:            Medication:  NITROGLYCERIN 0.4 MG SL SUBL      How I use it:  Place 1 tablet under the tongue every 5 minutes as needed for chest pain      Why I use it:  Chest pain    Prescriber:  Patient Reported      Date I started using it:       Date I stopped using it:         Why I stopped using it:            Medication:  RANITIDINE  MG PO TABS      How I use it:  Take 1 tablet (150 mg) by mouth 2 times daily      Why I use it: Gastroesophageal reflux disease    Prescriber:  Jorge Barnett MD      Date I started using it:       Date I stopped using it:         Why I stopped using it:            Medication:  RANOLAZINE  MG PO TB12      How I use it:  Take 2 tablets (1,000 mg) by mouth 2 times daily      Why I use it: Chest pain    Prescriber:  Jorge Barnett MD      Date I started using it:       Date I stopped using it:         Why I stopped using it:            Medication:  ROSUVASTATIN CALCIUM 10 MG PO TABS      How I use it:  TAKE ONE TABLET BY MOUTH TWICE DAILY      Why I use it: Cholesterol; Heart disease    Prescriber:  Jorge Barnett MD      Date I started using it:       Date I stopped using it:         Why I stopped using it:            Medication:  SALINE NA GEL      How I use it:  Spray 1 spray in nostril every hour as needed For Nasal Dryness      Why I use it:  Dry nose    Prescriber:  Patient Reported      Date I started using  it:       Date I stopped using it:         Why I stopped using it:            Medication:         How I use it:         Why I use it:      Prescriber:         Date I started using it:       Date I stopped using it:         Why I stopped using it:            Medication:         How I use it:         Why I use it:      Prescriber:         Date I started using it:       Date I stopped using it:         Why I stopped using it:            Medication:         How I use it:         Why I use it:      Prescriber:         Date I started using it:       Date I stopped using it:         Why I stopped using it:              Other Information:     If you have any questions about your action plan, call [phone].    According to the Paperwork Reduction Act of 1995, no persons are required to respond to a collection of information unless it displays a valid OMB control number. The valid OMB number for this information collection is [id number]. The time required to complete this information collection is estimated to average 40 minutes per response, including the time to review instructions, searching existing data resources, gather the data needed, and complete and review the information collection. If you have any comments concerning the accuracy of the time estimate(s) or suggestions for improving this form, please write to: CMS, Attn: [employer] Reports Clearance Officer, [address] [place] [phone].      f/u PCP, GYN, return if worse/Other specify

## 2021-10-22 NOTE — ED PROVIDER NOTE - PROGRESS NOTE DETAILS
Ana Lilia Jefferson MD  U/S results available, 1.8 cm left ovarian cyst, no evidence of torsion. Patient is feeling much better. Patient urinated at U/S, not able to give a urine sample at this time, will give a fluid bolus. patient signed out to me follow U/S and UA. I examined patient no abdominal tenderness, no CVA tenderness Ana Lilia Jefferson MD  UA small blood, no red cells. Patient is feeling comfortable, did not want to be sent home on nausea or pain medication. Given careful return precautions.

## 2021-10-22 NOTE — ED ADULT NURSE NOTE - OBJECTIVE STATEMENT
Pt presents for eval of sudden onset suprapubic pain/llq abd pain which came on suddenly this morning.  She is in acute distress, denies vaginal bleeding or discharge.  She has hx newly diagnosed ovarian cysts.  Recently had polyps removed from the uterus.

## 2021-10-22 NOTE — ED PROVIDER NOTE - PATIENT PORTAL LINK FT
You can access the FollowMyHealth Patient Portal offered by [facility] by registering at the following website: [email or web address]. By joining [facility]’s FollowMyHealth portal, you will also be able to view your health information using other applications (apps) compatible with our system.

## 2021-10-22 NOTE — ED PROVIDER NOTE - OBJECTIVE STATEMENT
Patient with acute onset llq pain at 11am. Patient with 10/10 pain and in distress upon arrival. Patient reports last pregnancy test was negative in September and last menstruation was in June. Patient has history of cystic ovaries. No n/v, no sob, no cough, no cp, no fever.  No vaginal bleeding.

## 2021-10-22 NOTE — ED PROVIDER NOTE - NSFOLLOWUPINSTRUCTIONS_ED_ALL_ED_FT
1. Return to ED for worsening, progressive or any other concerning symptoms   2. Follow up with your primary care doctor in 2-3days  3. Motrin 600 mg every 6 hours as needed for pain.

## 2021-10-25 PROBLEM — N83.209 UNSPECIFIED OVARIAN CYST, UNSPECIFIED SIDE: Chronic | Status: ACTIVE | Noted: 2021-10-22

## 2021-10-25 PROBLEM — N84.0 POLYP OF CORPUS UTERI: Chronic | Status: ACTIVE | Noted: 2021-10-22

## 2021-10-27 ENCOUNTER — NON-APPOINTMENT (OUTPATIENT)
Age: 36
End: 2021-10-27

## 2021-10-28 ENCOUNTER — APPOINTMENT (OUTPATIENT)
Dept: OBGYN | Facility: CLINIC | Age: 36
End: 2021-10-28
Payer: COMMERCIAL

## 2021-10-28 VITALS
WEIGHT: 244 LBS | BODY MASS INDEX: 41.66 KG/M2 | HEIGHT: 64 IN | DIASTOLIC BLOOD PRESSURE: 64 MMHG | SYSTOLIC BLOOD PRESSURE: 100 MMHG

## 2021-10-28 DIAGNOSIS — R10.32 LEFT LOWER QUADRANT PAIN: ICD-10-CM

## 2021-10-28 PROCEDURE — 99214 OFFICE O/P EST MOD 30 MIN: CPT

## 2021-10-28 NOTE — PHYSICAL EXAM
[Chaperone Declined] : Patient declined chaperone [Appropriately responsive] : appropriately responsive [No Acute Distress] : no acute distress [Soft] : soft [Non-tender] : non-tender [Labia Majora] : normal [Labia Minora] : normal [Normal] : normal [Uterine Adnexae] : normal [FreeTextEntry5] : no polyp

## 2021-10-28 NOTE — PLAN
[FreeTextEntry1] : cervical polyp that appears to have increased in size\par discussed hysteroscopic removal\par discussed nature of procedure and risk\par  pt agrees w plan\par all questions answered\par \par decreasing in size ovarian cyst\par discussed small dermoid to observe\par as not in pain will observe\par repeat sono in 3 week for resolution or continued decreases\par \par pelvic rest, no vigorous activity\par torsion risk discussed

## 2021-10-28 NOTE — HISTORY OF PRESENT ILLNESS
[FreeTextEntry1] : 34yo P1 LMP ~ 1 weeks ago,  seen in ED last week for severe pelvic pain \par fullday of worsning pain, IV pain meds w nml dopplers and resolution of pain once meds complete\par pt w 4 cm left ovarian cyst in late September and asymptomatic--sonolast wee show 2 cm dermoid and 2-3 cm simple cyst\par \par cyst decreasing in size\par pain resolve\par desire fertility and trying to loose wt--working w \par \par note 0.8 cm cervical polyp now 2.3 cm on new sono\par no irreg bleeding

## 2021-11-12 ENCOUNTER — OUTPATIENT (OUTPATIENT)
Dept: OUTPATIENT SERVICES | Facility: HOSPITAL | Age: 36
LOS: 1 days | End: 2021-11-12
Payer: COMMERCIAL

## 2021-11-12 VITALS
HEART RATE: 61 BPM | SYSTOLIC BLOOD PRESSURE: 121 MMHG | OXYGEN SATURATION: 98 % | RESPIRATION RATE: 15 BRPM | TEMPERATURE: 99 F | WEIGHT: 240.08 LBS | DIASTOLIC BLOOD PRESSURE: 83 MMHG | HEIGHT: 65.5 IN

## 2021-11-12 DIAGNOSIS — N84.0 POLYP OF CORPUS UTERI: ICD-10-CM

## 2021-11-12 DIAGNOSIS — Z98.891 HISTORY OF UTERINE SCAR FROM PREVIOUS SURGERY: Chronic | ICD-10-CM

## 2021-11-12 DIAGNOSIS — D36.9 BENIGN NEOPLASM, UNSPECIFIED SITE: ICD-10-CM

## 2021-11-12 DIAGNOSIS — Z01.818 ENCOUNTER FOR OTHER PREPROCEDURAL EXAMINATION: ICD-10-CM

## 2021-11-12 LAB
BLD GP AB SCN SERPL QL: NEGATIVE — SIGNIFICANT CHANGE UP
HCG SERPL-ACNC: <2 MIU/ML — SIGNIFICANT CHANGE UP
HCT VFR BLD CALC: 41.5 % — SIGNIFICANT CHANGE UP (ref 34.5–45)
HGB BLD-MCNC: 13.1 G/DL — SIGNIFICANT CHANGE UP (ref 11.5–15.5)
MCHC RBC-ENTMCNC: 27.5 PG — SIGNIFICANT CHANGE UP (ref 27–34)
MCHC RBC-ENTMCNC: 31.6 GM/DL — LOW (ref 32–36)
MCV RBC AUTO: 87.2 FL — SIGNIFICANT CHANGE UP (ref 80–100)
NRBC # BLD: 0 /100 WBCS — SIGNIFICANT CHANGE UP (ref 0–0)
PLATELET # BLD AUTO: 359 K/UL — SIGNIFICANT CHANGE UP (ref 150–400)
RBC # BLD: 4.76 M/UL — SIGNIFICANT CHANGE UP (ref 3.8–5.2)
RBC # FLD: 13.4 % — SIGNIFICANT CHANGE UP (ref 10.3–14.5)
RH IG SCN BLD-IMP: NEGATIVE — SIGNIFICANT CHANGE UP
WBC # BLD: 10.72 K/UL — HIGH (ref 3.8–10.5)
WBC # FLD AUTO: 10.72 K/UL — HIGH (ref 3.8–10.5)

## 2021-11-12 PROCEDURE — 85027 COMPLETE CBC AUTOMATED: CPT

## 2021-11-12 PROCEDURE — 86900 BLOOD TYPING SEROLOGIC ABO: CPT

## 2021-11-12 PROCEDURE — G0463: CPT

## 2021-11-12 PROCEDURE — 86850 RBC ANTIBODY SCREEN: CPT

## 2021-11-12 PROCEDURE — 86901 BLOOD TYPING SEROLOGIC RH(D): CPT

## 2021-11-12 PROCEDURE — 84702 CHORIONIC GONADOTROPIN TEST: CPT

## 2021-11-12 RX ORDER — LIDOCAINE HCL 20 MG/ML
0.2 VIAL (ML) INJECTION ONCE
Refills: 0 | Status: DISCONTINUED | OUTPATIENT
Start: 2021-11-26 | End: 2021-12-10

## 2021-11-12 RX ORDER — SODIUM CHLORIDE 9 MG/ML
3 INJECTION INTRAMUSCULAR; INTRAVENOUS; SUBCUTANEOUS EVERY 8 HOURS
Refills: 0 | Status: DISCONTINUED | OUTPATIENT
Start: 2021-11-26 | End: 2021-12-10

## 2021-11-12 NOTE — H&P PST ADULT - HISTORY OF PRESENT ILLNESS
35yo female. PMH asthma (mild, well controlled).  10/22/2021 went to [facility] ED with c/o pelvic pain, work up revealed dermoid cysts x2.  evaluated by Armani Beard, found dermoid cysts size increased, now presents to [facility] scheduled for D&C on 11/26.  covid test scheduled on 11/23 at [facility]. 35yo female. PMH asthma (mild, well controlled), obesity (BMI=39).  10/22/2021 went to [facility] ED with c/o pelvic pain, work up revealed dermoid cysts x2.  pt was followed up by Dr. Zurita, found dermoid cysts size increased, now presents to PST scheduled for D&C on 11/26.  covid test scheduled on 11/23 at [facility]. 37yo female. PMH asthma (mild, well controlled), obesity (BMI=39).  10/22/2021 went to [facility] ED with c/o pelvic pain, work up revealed dermoid cysts x2.  pt was followed up by Dr. Zurita, found dermoid cysts size increased, now presents to PST scheduled for D&C on 11/26.  covid test scheduled on 11/23 at [facility].  **pt hasn't had a period for over 1 month.  serum pregnancy test sent today, will follow up result.** 35yo female. PMH asthma (mild, well controlled), obesity (BMI=39).  10/22/2021 went to [facility] ED with c/o pelvic pain, work up revealed dermoid cysts x2.  pt was followed up by Dr. Zurita, found dermoid cysts size increased, now presents to PST scheduled for D&C on 11/26.  covid test scheduled on 11/23 at [facility].  *called and left message with surgeon, Dr. Zurita's surgical coordinator to verify procedure, pt stated she's having dermoid cyst excision, the booking states "operative hysteroscopy with Myosure"*  **pt hasn't had a period for over 1 month.  serum pregnancy test sent today, will follow up result.**

## 2021-11-12 NOTE — H&P PST ADULT - ATTENDING COMMENTS
35yo P LMP 11/21 (day 5) here for poypecotmy, D&C removal diseased tissue, operative hysteroscopy for enlarging enometrial vs cervical polyp    recent h/o stable ovarian cyst (over a months time) noted after ED visit for abd pain    reviewed nature of procedure, d/u imaging for cyst  --reviewed pain management, poss risk and complications and common complaints    Nsaids for pain postop and f/u in 1-3 weeks    all questions answered

## 2021-11-15 ENCOUNTER — RESULT REVIEW (OUTPATIENT)
Age: 36
End: 2021-11-15

## 2021-11-15 ENCOUNTER — OUTPATIENT (OUTPATIENT)
Dept: OUTPATIENT SERVICES | Facility: HOSPITAL | Age: 36
LOS: 1 days | End: 2021-11-15
Payer: COMMERCIAL

## 2021-11-15 ENCOUNTER — APPOINTMENT (OUTPATIENT)
Dept: ULTRASOUND IMAGING | Facility: CLINIC | Age: 36
End: 2021-11-15
Payer: COMMERCIAL

## 2021-11-15 DIAGNOSIS — Z98.891 HISTORY OF UTERINE SCAR FROM PREVIOUS SURGERY: Chronic | ICD-10-CM

## 2021-11-15 DIAGNOSIS — N83.201 UNSPECIFIED OVARIAN CYST, RIGHT SIDE: ICD-10-CM

## 2021-11-15 PROBLEM — J45.909 UNSPECIFIED ASTHMA, UNCOMPLICATED: Chronic | Status: ACTIVE | Noted: 2021-11-12

## 2021-11-15 PROCEDURE — 76830 TRANSVAGINAL US NON-OB: CPT

## 2021-11-15 PROCEDURE — 76830 TRANSVAGINAL US NON-OB: CPT | Mod: 26

## 2021-11-19 ENCOUNTER — APPOINTMENT (OUTPATIENT)
Dept: OBGYN | Facility: CLINIC | Age: 36
End: 2021-11-19
Payer: COMMERCIAL

## 2021-11-19 DIAGNOSIS — N83.201 UNSPECIFIED OVARIAN CYST, RIGHT SIDE: ICD-10-CM

## 2021-11-19 DIAGNOSIS — N83.202 UNSPECIFIED OVARIAN CYST, RIGHT SIDE: ICD-10-CM

## 2021-11-19 PROCEDURE — 99213 OFFICE O/P EST LOW 20 MIN: CPT | Mod: 95

## 2021-11-23 ENCOUNTER — OUTPATIENT (OUTPATIENT)
Dept: OUTPATIENT SERVICES | Facility: HOSPITAL | Age: 36
LOS: 1 days | End: 2021-11-23
Payer: COMMERCIAL

## 2021-11-23 DIAGNOSIS — Z11.52 ENCOUNTER FOR SCREENING FOR COVID-19: ICD-10-CM

## 2021-11-23 DIAGNOSIS — Z98.891 HISTORY OF UTERINE SCAR FROM PREVIOUS SURGERY: Chronic | ICD-10-CM

## 2021-11-23 LAB — SARS-COV-2 RNA SPEC QL NAA+PROBE: SIGNIFICANT CHANGE UP

## 2021-11-23 PROCEDURE — U0005: CPT

## 2021-11-23 PROCEDURE — U0003: CPT

## 2021-11-23 PROCEDURE — C9803: CPT

## 2021-11-25 ENCOUNTER — TRANSCRIPTION ENCOUNTER (OUTPATIENT)
Age: 36
End: 2021-11-25

## 2021-11-25 RX ORDER — ONDANSETRON 8 MG/1
4 TABLET, FILM COATED ORAL ONCE
Refills: 0 | Status: DISCONTINUED | OUTPATIENT
Start: 2021-11-26 | End: 2021-12-10

## 2021-11-25 RX ORDER — IBUPROFEN 200 MG
800 TABLET ORAL ONCE
Refills: 0 | Status: DISCONTINUED | OUTPATIENT
Start: 2021-11-26 | End: 2021-12-10

## 2021-11-25 RX ORDER — OXYCODONE HYDROCHLORIDE 5 MG/1
5 TABLET ORAL ONCE
Refills: 0 | Status: DISCONTINUED | OUTPATIENT
Start: 2021-11-26 | End: 2021-11-26

## 2021-11-25 RX ORDER — SODIUM CHLORIDE 9 MG/ML
1000 INJECTION, SOLUTION INTRAVENOUS
Refills: 0 | Status: DISCONTINUED | OUTPATIENT
Start: 2021-11-26 | End: 2021-12-10

## 2021-11-26 ENCOUNTER — APPOINTMENT (OUTPATIENT)
Dept: OBGYN | Facility: CLINIC | Age: 36
End: 2021-11-26

## 2021-11-26 ENCOUNTER — RESULT REVIEW (OUTPATIENT)
Age: 36
End: 2021-11-26

## 2021-11-26 ENCOUNTER — OUTPATIENT (OUTPATIENT)
Dept: OUTPATIENT SERVICES | Facility: HOSPITAL | Age: 36
LOS: 1 days | End: 2021-11-26
Payer: COMMERCIAL

## 2021-11-26 VITALS
OXYGEN SATURATION: 100 % | HEART RATE: 89 BPM | DIASTOLIC BLOOD PRESSURE: 63 MMHG | RESPIRATION RATE: 15 BRPM | SYSTOLIC BLOOD PRESSURE: 111 MMHG

## 2021-11-26 VITALS
HEART RATE: 104 BPM | HEIGHT: 65 IN | DIASTOLIC BLOOD PRESSURE: 71 MMHG | WEIGHT: 240.08 LBS | RESPIRATION RATE: 18 BRPM | OXYGEN SATURATION: 98 % | TEMPERATURE: 100 F | SYSTOLIC BLOOD PRESSURE: 122 MMHG

## 2021-11-26 DIAGNOSIS — N84.0 POLYP OF CORPUS UTERI: ICD-10-CM

## 2021-11-26 DIAGNOSIS — Z98.891 HISTORY OF UTERINE SCAR FROM PREVIOUS SURGERY: Chronic | ICD-10-CM

## 2021-11-26 PROCEDURE — 58558 HYSTEROSCOPY BIOPSY: CPT

## 2021-11-26 PROCEDURE — 88305 TISSUE EXAM BY PATHOLOGIST: CPT

## 2021-11-26 PROCEDURE — 86850 RBC ANTIBODY SCREEN: CPT

## 2021-11-26 PROCEDURE — 86900 BLOOD TYPING SEROLOGIC ABO: CPT

## 2021-11-26 PROCEDURE — 88305 TISSUE EXAM BY PATHOLOGIST: CPT | Mod: 26

## 2021-11-26 PROCEDURE — 86922 COMPATIBILITY TEST ANTIGLOB: CPT

## 2021-11-26 PROCEDURE — 86901 BLOOD TYPING SEROLOGIC RH(D): CPT

## 2021-11-26 RX ORDER — IBUPROFEN 200 MG
1 TABLET ORAL
Qty: 0 | Refills: 0 | DISCHARGE
Start: 2021-11-26

## 2021-11-26 NOTE — ASU PREOP CHECKLIST - TEMPERATURE IN FAHRENHEIT (DEGREES F)
----- Message from [place] sent at 10/22/2019  8:55 AM EDT -----  Please call the patient regarding her abnormal result   A1C at goal  Cholesterol and LDL elevated is she taking statin currently 98.2

## 2021-11-26 NOTE — BRIEF OPERATIVE NOTE - NSICDXBRIEFPROCEDURE_GEN_ALL_CORE_FT
PROCEDURES:  Hysteroscopy, with dilation and curettage of uterus and polypectomy or uterine myomectomy using MyoPeixe Urbanore tissue removal system 26-Nov-2021 15:28:06  Nadeen Cuenca

## 2021-11-26 NOTE — PRE-ANESTHESIA EVALUATION ADULT - NSANTHINDVINFOSD_GEN_ALL_CORE
Dear Josh Weber,  Thank you for referring your patient to [facility]. We value our relationship with you and appreciate your confidence in our service and staff.    It is with great pleasure that we were able to provide treatment to [facility] patient

## 2021-11-26 NOTE — ASU DISCHARGE PLAN (ADULT/PEDIATRIC) - CARE PROVIDER_API CALL
Louise Zurita)  OBSN  General  [address] [address]  Phone: [phone]  Fax: [phone]  Follow Up Time: 2 weeks

## 2021-11-26 NOTE — BRIEF OPERATIVE NOTE - OPERATION/FINDINGS
EUA revealed anteverted 8 week sized uterus. Adnexa were not palpable bilaterally. Grossly normal external genitalia. Normal cervix. Hysteroscopy revealed normal uterine cavity with normal ostia bilaterally. Uterine cavity notable for two polypoid structures at internal os and endometrial tissue consistent with menstruation. EUA revealed anteverted 8 week sized uterus. Adnexa were not palpable bilaterally. Grossly normal external genitalia. Normal cervix. Hysteroscopy revealed normal uterine cavity with normal ostia bilaterally. Uterine cavity notable for two polypoid structures at internal os and endometrial tissue consistent with end of menstruation.

## 2021-12-01 ENCOUNTER — APPOINTMENT (OUTPATIENT)
Dept: INTERNAL MEDICINE | Facility: CLINIC | Age: 36
End: 2021-12-01
Payer: COMMERCIAL

## 2021-12-01 DIAGNOSIS — J02.9 ACUTE PHARYNGITIS, UNSPECIFIED: ICD-10-CM

## 2021-12-01 PROCEDURE — 99213 OFFICE O/P EST LOW 20 MIN: CPT | Mod: 95

## 2021-12-01 NOTE — PHYSICAL EXAM
[de-identified] : Limited due to telehealth. Speaking in complete sentences in no acute distress.

## 2021-12-01 NOTE — REVIEW OF SYSTEMS
[Chills] : chills [Fatigue] : fatigue [Nasal Discharge] : nasal discharge [Sore Throat] : sore throat [Postnasal Drip] : postnasal drip [Cough] : cough [Negative] : Neurological

## 2021-12-01 NOTE — ASSESSMENT
[FreeTextEntry1] : -Increase fluids, such as pedialyte, gatorade and ginger ale,  rest.\par -Salt water gargles, ice chips to soathe throat tid.\par -Increase humidity in your home with a vaporizer or humidifier\par -Try a saline nasal mist, such as Simply Saline, Ocean Spray\par -Steam expectoration is recommended\par -Tablespoon of honey with tea twice daily as needed\par -OTC analgesic, Tylenol, ibuprofen as needed.\par -OTC decongestants of choice, as needed\par -Consider use of a decongestant nasal spray such as Flonase, Nasocort/or Nasonex at night to help open nasal/sinus passages for easier breathing and better sinus drainage. \par -Follow-up as needed if symptoms not improved in  3-5 days, sooner if worsens.\par

## 2021-12-01 NOTE — HISTORY OF PRESENT ILLNESS
[Home] : at home, [unfilled] , at the time of the visit. [Medical Office: ([facility])___] : at the medical office located in  [Verbal consent obtained from patient] : the patient, [unfilled] [FreeTextEntry8] : 36 year old female presents complaining of sore throat for the last week. Associated with subjective chills, fatigue and swollen glands. Has been taking over-the-counter meds with no major relief. Did undergo ovarian cyst removal excision last week. Decreased p.o. intake due to throat pain. No nausea, vomiting, chest pain, shortness of breath or palpitations.\par

## 2021-12-07 LAB — SURGICAL PATHOLOGY STUDY: SIGNIFICANT CHANGE UP

## 2021-12-09 ENCOUNTER — APPOINTMENT (OUTPATIENT)
Dept: OBGYN | Facility: CLINIC | Age: 36
End: 2021-12-09
Payer: COMMERCIAL

## 2021-12-09 VITALS — BODY MASS INDEX: 39.48 KG/M2 | WEIGHT: 230 LBS | SYSTOLIC BLOOD PRESSURE: 120 MMHG | DIASTOLIC BLOOD PRESSURE: 82 MMHG

## 2021-12-09 PROCEDURE — 99212 OFFICE O/P EST SF 10 MIN: CPT

## 2021-12-09 NOTE — HISTORY OF PRESENT ILLNESS
[Pain is well-controlled] : pain is well-controlled [Fever] : no fever [Nausea] : no nausea [Diarrhea] : no diarrhea [Vaginal Bleeding] : no vaginal bleeding [Pelvic Pressure] : no pelvic pressure [Vaginal Discharge] : no vaginal discharge [Constipation] : no constipation [Clean/Dry/Intact] : clean, dry and intact [None] : no vaginal bleeding [Normal] : normal [Pathology reviewed] : pathology reviewed [de-identified] : hysteroscopy,polypectomyu [de-identified] : polyp [de-identified] : vulvar irritation

## 2021-12-09 NOTE — ASSESSMENT
[Doing Well] : is doing well [No Sign of Infection] : is showing no signs of infection [de-identified] : vulvitis

## 2021-12-10 LAB
CANDIDA VAG CYTO: DETECTED
G VAGINALIS+PREV SP MTYP VAG QL MICRO: NOT DETECTED
T VAGINALIS VAG QL WET PREP: NOT DETECTED

## 2021-12-14 LAB — BACTERIA UR CULT: NORMAL

## 2022-02-24 ENCOUNTER — APPOINTMENT (OUTPATIENT)
Dept: INTERNAL MEDICINE | Facility: CLINIC | Age: 37
End: 2022-02-24

## 2022-03-01 ENCOUNTER — NON-APPOINTMENT (OUTPATIENT)
Age: 37
End: 2022-03-01

## 2022-03-01 ENCOUNTER — RX RENEWAL (OUTPATIENT)
Age: 37
End: 2022-03-01

## 2022-03-01 LAB
APPEARANCE: ABNORMAL
BACTERIA UR CULT: ABNORMAL
BILIRUBIN URINE: NEGATIVE
BLOOD URINE: ABNORMAL
COLOR: YELLOW
GLUCOSE QUALITATIVE U: NEGATIVE
KETONES URINE: NEGATIVE
LEUKOCYTE ESTERASE URINE: ABNORMAL
NITRITE URINE: POSITIVE
PH URINE: 6
PROTEIN URINE: NORMAL
SPECIFIC GRAVITY URINE: 1.02
UROBILINOGEN URINE: NORMAL

## 2022-03-13 LAB — SARS-COV-2 N GENE NPH QL NAA+PROBE: NOT DETECTED

## 2022-03-15 ENCOUNTER — APPOINTMENT (OUTPATIENT)
Dept: INTERNAL MEDICINE | Facility: CLINIC | Age: 37
End: 2022-03-15
Payer: COMMERCIAL

## 2022-03-15 ENCOUNTER — APPOINTMENT (OUTPATIENT)
Dept: PULMONOLOGY | Facility: CLINIC | Age: 37
End: 2022-03-15
Payer: COMMERCIAL

## 2022-03-15 VITALS
OXYGEN SATURATION: 99 % | DIASTOLIC BLOOD PRESSURE: 66 MMHG | WEIGHT: 244 LBS | HEART RATE: 78 BPM | SYSTOLIC BLOOD PRESSURE: 106 MMHG | HEIGHT: 64.5 IN | RESPIRATION RATE: 17 BRPM | TEMPERATURE: 97.5 F | BODY MASS INDEX: 41.15 KG/M2

## 2022-03-15 PROCEDURE — 94010 BREATHING CAPACITY TEST: CPT

## 2022-03-15 PROCEDURE — 94726 PLETHYSMOGRAPHY LUNG VOLUMES: CPT

## 2022-03-15 PROCEDURE — 94729 DIFFUSING CAPACITY: CPT

## 2022-03-15 PROCEDURE — 99204 OFFICE O/P NEW MOD 45 MIN: CPT | Mod: 25

## 2022-03-15 NOTE — PROCEDURE
[FreeTextEntry1] : PFT 3/15/22 personally reviewed normal spirometry, normal lung volumes, mild gas exchange abnormality

## 2022-03-15 NOTE — REVIEW OF SYSTEMS
[Recent Wt Gain (___ Lbs)] : ~T recent [unfilled] lb weight gain [Cough] : cough [Chest Tightness] : chest tightness [Wheezing] : wheezing [Negative] : Endocrine

## 2022-03-15 NOTE — ASSESSMENT
[FreeTextEntry1] : 36 year old female Hx childhood asthma presents for evaluation\par \par Initiate trial of Breo Ellipta 100 daily as directed\par Albuterol rescue 2 puffs if needed\par Educational materials provided\par MTC testing when able\par \par Follow up 4-6 weeks

## 2022-03-15 NOTE — HISTORY OF PRESENT ILLNESS
[Never] : never [TextBox_4] : Patient is a 36 year old female Hx asthma presents to [facility] for evaluation cough, recurrent bronchitis.  Patient reports she experienced bronchitis over the winter.  She has been using albuterol as needed.  She denies chest pain, hemoptysis, or systemic complaints.

## 2022-04-04 ENCOUNTER — NON-APPOINTMENT (OUTPATIENT)
Age: 37
End: 2022-04-04

## 2022-04-04 ENCOUNTER — APPOINTMENT (OUTPATIENT)
Dept: INTERNAL MEDICINE | Facility: CLINIC | Age: 37
End: 2022-04-04
Payer: COMMERCIAL

## 2022-04-04 VITALS
WEIGHT: 236 LBS | SYSTOLIC BLOOD PRESSURE: 117 MMHG | BODY MASS INDEX: 39.8 KG/M2 | DIASTOLIC BLOOD PRESSURE: 79 MMHG | HEART RATE: 92 BPM | OXYGEN SATURATION: 97 % | HEIGHT: 64.5 IN

## 2022-04-04 LAB
BILIRUB UR QL STRIP: NORMAL
CLARITY UR: CLEAR
COLLECTION METHOD: NORMAL
GLUCOSE UR-MCNC: NORMAL
HCG UR QL: 0.2 EU/DL
HGB UR QL STRIP.AUTO: NORMAL
KETONES UR-MCNC: NORMAL
LEUKOCYTE ESTERASE UR QL STRIP: NORMAL
NITRITE UR QL STRIP: POSITIVE
PH UR STRIP: 6.5
PROT UR STRIP-MCNC: NORMAL
SP GR UR STRIP: 1.02

## 2022-04-04 PROCEDURE — 93000 ELECTROCARDIOGRAM COMPLETE: CPT

## 2022-04-04 PROCEDURE — 36415 COLL VENOUS BLD VENIPUNCTURE: CPT

## 2022-04-04 PROCEDURE — 99395 PREV VISIT EST AGE 18-39: CPT | Mod: 25

## 2022-04-04 RX ORDER — SULFAMETHOXAZOLE AND TRIMETHOPRIM 800; 160 MG/1; MG/1
800-160 TABLET ORAL
Qty: 10 | Refills: 0 | Status: DISCONTINUED | COMMUNITY
Start: 2022-03-01 | End: 2022-04-04

## 2022-04-04 RX ORDER — NALTREXONE HYDROCHLORIDE AND BUPROPION HYDROCHLORIDE 8; 90 MG/1; MG/1
8-90 TABLET, EXTENDED RELEASE ORAL
Qty: 30 | Refills: 3 | Status: DISCONTINUED | COMMUNITY
Start: 2021-03-29 | End: 2022-04-04

## 2022-04-04 RX ORDER — FLUCONAZOLE 150 MG/1
150 TABLET ORAL
Qty: 2 | Refills: 0 | Status: DISCONTINUED | COMMUNITY
Start: 2021-12-10 | End: 2022-04-04

## 2022-04-04 RX ORDER — PREDNISONE 10 MG/1
10 TABLET ORAL
Qty: 21 | Refills: 0 | Status: DISCONTINUED | COMMUNITY
Start: 2021-03-31 | End: 2022-04-04

## 2022-04-04 RX ORDER — FLUTICASONE PROPIONATE AND SALMETEROL 250; 50 UG/1; UG/1
250-50 POWDER RESPIRATORY (INHALATION)
Qty: 1 | Refills: 10 | Status: DISCONTINUED | COMMUNITY
Start: 2020-06-10 | End: 2022-04-04

## 2022-04-04 RX ORDER — AZITHROMYCIN DIHYDRATE 250 MG/1
250 TABLET, FILM COATED ORAL
Qty: 1 | Refills: 0 | Status: DISCONTINUED | COMMUNITY
Start: 2021-03-31 | End: 2022-04-04

## 2022-04-04 RX ORDER — METHYLPREDNISOLONE 4 MG/1
4 TABLET ORAL
Qty: 1 | Refills: 0 | Status: DISCONTINUED | COMMUNITY
Start: 2021-12-01 | End: 2022-04-04

## 2022-04-04 RX ORDER — PREDNISONE 10 MG/1
10 TABLET ORAL
Qty: 21 | Refills: 0 | Status: DISCONTINUED | COMMUNITY
Start: 2021-12-17 | End: 2022-04-04

## 2022-04-04 NOTE — HEALTH RISK ASSESSMENT
[Good] : ~his/her~  mood as  good [Never] : Never [No] : In the past 12 months have you used drugs other than those required for medical reasons? No [No falls in past year] : Patient reported no falls in the past year [0] : 2) Feeling down, depressed, or hopeless: Not at all (0) [PHQ-2 Negative - No further assessment needed] : PHQ-2 Negative - No further assessment needed [Employed] : employed [] :  [# Of Children ___] : has [unfilled] children [Sexually Active] : sexually active [Fully functional (bathing, dressing, toileting, transferring, walking, feeding)] : Fully functional (bathing, dressing, toileting, transferring, walking, feeding) [Fully functional (using the telephone, shopping, preparing meals, housekeeping, doing laundry, using] : Fully functional and needs no help or supervision to perform IADLs (using the telephone, shopping, preparing meals, housekeeping, doing laundry, using transportation, managing medications and managing finances) [[id number]] : 0 [Patient reported PAP Smear was normal] : Patient reported PAP Smear was normal [Change in mental status noted] : No change in mental status noted [High Risk Behavior] : no high risk behavior [Reports changes in hearing] : Reports no changes in hearing [Reports changes in vision] : Reports no changes in vision [Reports changes in dental health] : Reports no changes in dental health [PapSmearDate] : 9/2021

## 2022-04-04 NOTE — HISTORY OF PRESENT ILLNESS
[de-identified] : 36 year old female with asthma  presents for annual exam.\par \par Main concern today continues to be her weight gain despite diet and exercise.  Did try course of Contrave for about 3 months, no results states has invested in Peloton and has been using it daily.  Has tried different diets in the past But Unfortunately got discouraged and has been stress eating at times.  Gained about 15 to 20 pounds in the last year.  Works mostly sedentary.  Has tried orlistat for the last year with no major change.  Was seen by nutritionist,  in the past with no major change\par \par Also complaining of urinary frequency, urgency.  Recently treated for E. coli UTI with Bactrim for 5 days.  States symptoms improved but reoccurred.  States has been battling UTIs since procedure done in November.  Recently seen by pulmonology due to\par \par Recurrent bronchitis.  Started on Breo, states has not really noticed much change.\par \par Endorses snoring, daytime sleepiness, morning fatigue and restless sleep.  High risk for MANSI\par \par \par \par

## 2022-04-04 NOTE — PHYSICAL EXAM
[Normal] : normal rate, regular rhythm, normal S1 and S2 and no murmur heard [Pedal Pulses Present] : the pedal pulses are present [No Edema] : there was no peripheral edema [No Extremity Clubbing/Cyanosis] : no extremity clubbing/cyanosis [Soft] : abdomen soft [Non Tender] : non-tender [Non-distended] : non-distended [Normal Bowel Sounds] : normal bowel sounds [Normal Posterior Cervical Nodes] : no posterior cervical lymphadenopathy [Normal Anterior Cervical Nodes] : no anterior cervical lymphadenopathy [No CVA Tenderness] : no CVA  tenderness [No Spinal Tenderness] : no spinal tenderness [No Joint Swelling] : no joint swelling [No Rash] : no rash [No Focal Deficits] : no focal deficits [Normal Affect] : the affect was normal [Normal Mood] : the mood was normal

## 2022-04-04 NOTE — ASSESSMENT
[FreeTextEntry1] : 36 year old female presents for annual exam.\par \par \par Obesity, Main concern today continues to be her weight gain despite diet and exercise.  Did try course of Contrave for about 3 months, no results states has invested in Peloton and has been using it daily.  Has tried different diets in the past But Unfortunately got discouraged and has been stress eating at times.  Gained about 15 to 20 pounds in the last year.  Works mostly sedentary.  Has tried orlistat for the last year with no major change.  Was seen by nutritionist,  in the past with no major change\par -Advised we will try Ozempic, risks and benefits of medication discussed in detail\par -Being overweight increases your risk of health conditions such as heart disease, high blood pressure, type 2 diabetes, and certain types of cancer. It can also increase your risk for osteoarthritis, sleep apnea, and other respiratory problems. Aim for a slow, steady weight loss. Even a small amount of weight loss can lower your risk of health problems.\par \par \par Urinary frequency, urgency.  Recently treated for E. coli UTI with Bactrim for 5 days.  States symptoms improved but reoccurred.  States has been battling UTIs since procedure done in November.  \par - Start Ceftin as directed, monitor for side effects given possible penicillin allergy\par -Follow-up urine culture r\par \par \par Recurrent bronchitis.  Started on Breo, states has not really noticed much change.  Recently seen by pulmonologist\par Endorses snoring, daytime sleepiness, morning fatigue and restless sleep.  High risk for MANSI\par -Advised weight loss may help cure possible underlying MANSI\par -Follow-up with pulmonologist as directed, may benefit from sleep study\par \par Annual \par -Advise to get yearly Flu shot\par -Advise Yearly Eye exam with Ophthalmologist\par -Advise Yearly Dental exam\par -Educated of the importance of Healthy diet, such as Mediterranean Diet and Exercise, such as walking >20 minutes a day and increasing gradually as tolerated\par \par Preventative screening  \par -advised to get PAP for cervical cancer screening -up to date \par \par -covid-still refusing\par \par \par

## 2022-04-04 NOTE — REVIEW OF SYSTEMS
[Chills] : chills [Fatigue] : fatigue [Nasal Discharge] : nasal discharge [Sore Throat] : sore throat [Postnasal Drip] : postnasal drip [Cough] : cough [Dysuria] : dysuria [Frequency] : frequency [Negative] : Neurological

## 2022-04-06 ENCOUNTER — CLINICAL ADVICE (OUTPATIENT)
Age: 37
End: 2022-04-06

## 2022-04-06 LAB
25(OH)D3 SERPL-MCNC: 26.3 NG/ML
ALBUMIN SERPL ELPH-MCNC: 4.4 G/DL
ALP BLD-CCNC: 60 U/L
ALT SERPL-CCNC: 9 U/L
ANION GAP SERPL CALC-SCNC: 13 MMOL/L
APPEARANCE: ABNORMAL
AST SERPL-CCNC: 13 U/L
BACTERIA: ABNORMAL
BASOPHILS # BLD AUTO: 0.05 K/UL
BASOPHILS NFR BLD AUTO: 0.5 %
BILIRUB DIRECT SERPL-MCNC: 0.1 MG/DL
BILIRUB INDIRECT SERPL-MCNC: 0.1 MG/DL
BILIRUB SERPL-MCNC: 0.2 MG/DL
BILIRUBIN URINE: NEGATIVE
BLOOD URINE: ABNORMAL
BUN SERPL-MCNC: 11 MG/DL
C TRACH RRNA SPEC QL NAA+PROBE: NOT DETECTED
CALCIUM SERPL-MCNC: 9.3 MG/DL
CHLORIDE SERPL-SCNC: 105 MMOL/L
CHOLEST SERPL-MCNC: 204 MG/DL
CO2 SERPL-SCNC: 22 MMOL/L
COLOR: YELLOW
CREAT SERPL-MCNC: 0.75 MG/DL
EGFR: 105 ML/MIN/1.73M2
EOSINOPHIL # BLD AUTO: 0.06 K/UL
EOSINOPHIL NFR BLD AUTO: 0.6 %
ESTIMATED AVERAGE GLUCOSE: 105 MG/DL
FERRITIN SERPL-MCNC: 35 NG/ML
FOLATE SERPL-MCNC: 8.7 NG/ML
GLUCOSE QUALITATIVE U: NEGATIVE
GLUCOSE SERPL-MCNC: 103 MG/DL
HBA1C MFR BLD HPLC: 5.3 %
HBV SURFACE AB SER QL: NONREACTIVE
HBV SURFACE AG SER QL: NONREACTIVE
HCT VFR BLD CALC: 42.8 %
HCV AB SER QL: NONREACTIVE
HCV S/CO RATIO: 0.14 S/CO
HDLC SERPL-MCNC: 42 MG/DL
HGB BLD-MCNC: 13.4 G/DL
HIV1+2 AB SPEC QL IA.RAPID: NONREACTIVE
HYALINE CASTS: 3 /LPF
IMM GRANULOCYTES NFR BLD AUTO: 0.2 %
IRON SATN MFR SERPL: 11 %
IRON SERPL-MCNC: 43 UG/DL
KETONES URINE: NEGATIVE
LDLC SERPL CALC-MCNC: 133 MG/DL
LEUKOCYTE ESTERASE URINE: ABNORMAL
LYMPHOCYTES # BLD AUTO: 2.79 K/UL
LYMPHOCYTES NFR BLD AUTO: 27.8 %
MAN DIFF?: NORMAL
MCHC RBC-ENTMCNC: 27.5 PG
MCHC RBC-ENTMCNC: 31.3 GM/DL
MCV RBC AUTO: 87.7 FL
MICROSCOPIC-UA: NORMAL
MONOCYTES # BLD AUTO: 0.61 K/UL
MONOCYTES NFR BLD AUTO: 6.1 %
N GONORRHOEA RRNA SPEC QL NAA+PROBE: NOT DETECTED
NEUTROPHILS # BLD AUTO: 6.52 K/UL
NEUTROPHILS NFR BLD AUTO: 64.8 %
NITRITE URINE: POSITIVE
NONHDLC SERPL-MCNC: 162 MG/DL
PH URINE: 6.5
PLATELET # BLD AUTO: 355 K/UL
POTASSIUM SERPL-SCNC: 4.7 MMOL/L
PROT SERPL-MCNC: 7.3 G/DL
PROTEIN URINE: NORMAL
RBC # BLD: 4.88 M/UL
RBC # FLD: 13.7 %
RED BLOOD CELLS URINE: 3 /HPF
SODIUM SERPL-SCNC: 140 MMOL/L
SOURCE AMPLIFICATION: NORMAL
SPECIFIC GRAVITY URINE: 1.02
SQUAMOUS EPITHELIAL CELLS: 12 /HPF
T PALLIDUM AB SER QL IA: NEGATIVE
TIBC SERPL-MCNC: 391 UG/DL
TRIGL SERPL-MCNC: 142 MG/DL
TSH SERPL-ACNC: 1.46 UIU/ML
UIBC SERPL-MCNC: 348 UG/DL
UROBILINOGEN URINE: NORMAL
VIT B12 SERPL-MCNC: 386 PG/ML
WBC # FLD AUTO: 10.05 K/UL
WHITE BLOOD CELLS URINE: 283 /HPF

## 2022-04-07 ENCOUNTER — CLINICAL ADVICE (OUTPATIENT)
Age: 37
End: 2022-04-07

## 2022-04-07 LAB — BACTERIA UR CULT: ABNORMAL

## 2022-04-11 PROBLEM — Z11.59 SCREENING FOR VIRAL DISEASE: Status: ACTIVE | Noted: 2020-06-10

## 2022-04-26 ENCOUNTER — NON-APPOINTMENT (OUTPATIENT)
Age: 37
End: 2022-04-26

## 2022-05-04 ENCOUNTER — RX RENEWAL (OUTPATIENT)
Age: 37
End: 2022-05-04

## 2022-05-04 ENCOUNTER — APPOINTMENT (OUTPATIENT)
Dept: PULMONOLOGY | Facility: CLINIC | Age: 37
End: 2022-05-04
Payer: COMMERCIAL

## 2022-05-04 VITALS
TEMPERATURE: 97.34 F | SYSTOLIC BLOOD PRESSURE: 106 MMHG | HEIGHT: 65 IN | WEIGHT: 241 LBS | BODY MASS INDEX: 40.15 KG/M2 | DIASTOLIC BLOOD PRESSURE: 76 MMHG | OXYGEN SATURATION: 99 % | HEART RATE: 90 BPM

## 2022-05-04 PROCEDURE — 99214 OFFICE O/P EST MOD 30 MIN: CPT | Mod: 25

## 2022-05-04 NOTE — HISTORY OF PRESENT ILLNESS
[Never] : never [TextBox_4] : Patient is a 36 year old female Hx asthma presents to [facility] for follow up.  Patient has been using Breo-Ellipta 100 with good symptoms control.  She has not needed Albuterol rescue.  Allergy related symptoms are bothersome at this time.  She reports itchy eyes and throat.  She is otherwise well and reports no increased respiratory symptoms.

## 2022-05-23 ENCOUNTER — RX RENEWAL (OUTPATIENT)
Age: 37
End: 2022-05-23

## 2022-05-26 DIAGNOSIS — U07.1 COVID-19: ICD-10-CM

## 2022-06-16 ENCOUNTER — NON-APPOINTMENT (OUTPATIENT)
Age: 37
End: 2022-06-16

## 2022-07-06 ENCOUNTER — RX RENEWAL (OUTPATIENT)
Age: 37
End: 2022-07-06

## 2022-07-06 RX ORDER — LEVOCETIRIZINE DIHYDROCHLORIDE 5 MG/1
5 TABLET ORAL
Qty: 90 | Refills: 0 | Status: ACTIVE | COMMUNITY
Start: 2022-05-04 | End: 1900-01-01

## 2022-09-14 ENCOUNTER — APPOINTMENT (OUTPATIENT)
Dept: PULMONOLOGY | Facility: CLINIC | Age: 37
End: 2022-09-14

## 2022-09-15 ENCOUNTER — APPOINTMENT (OUTPATIENT)
Dept: PULMONOLOGY | Facility: CLINIC | Age: 37
End: 2022-09-15

## 2022-09-15 VITALS
OXYGEN SATURATION: 98 % | BODY MASS INDEX: 39.99 KG/M2 | SYSTOLIC BLOOD PRESSURE: 98 MMHG | HEART RATE: 79 BPM | TEMPERATURE: 97.6 F | WEIGHT: 240 LBS | RESPIRATION RATE: 16 BRPM | DIASTOLIC BLOOD PRESSURE: 62 MMHG | HEIGHT: 65 IN

## 2022-09-15 PROCEDURE — 99213 OFFICE O/P EST LOW 20 MIN: CPT | Mod: 25

## 2022-09-15 RX ORDER — LEVOCETIRIZINE DIHYDROCHLORIDE 5 MG/1
5 TABLET ORAL
Qty: 90 | Refills: 1 | Status: ACTIVE | COMMUNITY
Start: 2022-09-15 | End: 1900-01-01

## 2022-09-15 NOTE — ASSESSMENT
[FreeTextEntry1] : 36 year old female Hx asthma presents for follow up feeling well\par \par Continue Breo-Ellipta 100-25 daily as directed\par Albuterol 2 puffs if needed\par Xyzal 5 mg QHS\par \par Follow up 6 months

## 2022-09-15 NOTE — HISTORY OF PRESENT ILLNESS
[Never] : never [TextBox_4] : Patient is a 36 year old female Hx asthma presents to [facility] for follow up.  Patient has been using Breo-Ellipta 100 with good symptoms control.  She has not needed Albuterol rescue.  She reports she had COVID 19 over Memorial Day weekend.  She is now feeling well.  She uses Xyzal QHS.  She is here for follow up.

## 2022-09-20 ENCOUNTER — NON-APPOINTMENT (OUTPATIENT)
Age: 37
End: 2022-09-20

## 2022-09-21 ENCOUNTER — NON-APPOINTMENT (OUTPATIENT)
Age: 37
End: 2022-09-21

## 2022-09-22 ENCOUNTER — OUTPATIENT (OUTPATIENT)
Dept: OUTPATIENT SERVICES | Facility: HOSPITAL | Age: 37
LOS: 1 days | End: 2022-09-22
Payer: COMMERCIAL

## 2022-09-22 ENCOUNTER — APPOINTMENT (OUTPATIENT)
Dept: ULTRASOUND IMAGING | Facility: CLINIC | Age: 37
End: 2022-09-22

## 2022-09-22 DIAGNOSIS — Z98.891 HISTORY OF UTERINE SCAR FROM PREVIOUS SURGERY: Chronic | ICD-10-CM

## 2022-09-22 DIAGNOSIS — N92.6 IRREGULAR MENSTRUATION, UNSPECIFIED: ICD-10-CM

## 2022-09-22 PROCEDURE — 76830 TRANSVAGINAL US NON-OB: CPT | Mod: 26

## 2022-09-22 PROCEDURE — 76830 TRANSVAGINAL US NON-OB: CPT

## 2022-09-22 PROCEDURE — 76856 US EXAM PELVIC COMPLETE: CPT

## 2022-09-22 PROCEDURE — 76856 US EXAM PELVIC COMPLETE: CPT | Mod: 26

## 2022-10-26 NOTE — ED ADULT NURSE NOTE - NSSEPSISCRITERIAMET_ED_A_ED
Vaccine Information Statement(s) or the Emergency Use Authorization was given today. This has been reviewed, questions answered, and verbal consent given by Patient for injection(s) and administration of Influenza (Inactivated).      Patient tolerated without incident. See immunization grid for documentation.         Abnormal VS & WBC

## 2022-11-30 ENCOUNTER — APPOINTMENT (OUTPATIENT)
Dept: INTERNAL MEDICINE | Facility: CLINIC | Age: 37
End: 2022-11-30

## 2022-11-30 PROCEDURE — 99213 OFFICE O/P EST LOW 20 MIN: CPT | Mod: 95

## 2022-11-30 RX ORDER — PREDNISONE 20 MG/1
20 TABLET ORAL
Qty: 14 | Refills: 1 | Status: DISCONTINUED | COMMUNITY
Start: 2022-06-16 | End: 2022-11-30

## 2022-11-30 RX ORDER — ALBUTEROL SULFATE 2.5 MG/3ML
(2.5 MG/3ML) SOLUTION RESPIRATORY (INHALATION)
Qty: 75 | Refills: 0 | Status: DISCONTINUED | COMMUNITY
Start: 2022-04-04 | End: 2022-11-30

## 2022-11-30 RX ORDER — NIRMATRELVIR AND RITONAVIR 300-100 MG
20 X 150 MG & KIT ORAL
Qty: 1 | Refills: 0 | Status: DISCONTINUED | COMMUNITY
Start: 2022-05-26 | End: 2022-11-30

## 2022-11-30 RX ORDER — CEFUROXIME AXETIL 500 MG/1
500 TABLET ORAL
Qty: 14 | Refills: 0 | Status: DISCONTINUED | COMMUNITY
Start: 2022-04-04 | End: 2022-11-30

## 2022-11-30 NOTE — HISTORY OF PRESENT ILLNESS
[Home] : at home, [unfilled] , at the time of the visit. [Medical Office: ([facility])___] : at the medical office located in  [Verbal consent obtained from patient] : the patient, [unfilled] [FreeTextEntry8] : 37 year old female with c/o tightness and coughing since yesterday. \par She has no fevers. \par She did a rapid covid test. \par She did not take any medications. \par \par She states she is prone to pneumonia.

## 2022-11-30 NOTE — ASSESSMENT
[FreeTextEntry1] : Recommend supportive care including antipyretics, fluids, OTC cough/cold medications if age-appropriate, and nasal saline followed by nasal suction. Return if symptoms worsen or persist.\par Recommend antibiotics, nasal saline, and guaifenesin. Side effect of treatment includes but not limited to diarrhea. Return if symptoms worsen or persist.\par \par pt requesting abx , told she needs to treat the symptoms \par \par Due to nature of visit, full physical exam unable to be done to assess patient.\par

## 2022-12-08 ENCOUNTER — LABORATORY RESULT (OUTPATIENT)
Age: 37
End: 2022-12-08

## 2022-12-08 ENCOUNTER — APPOINTMENT (OUTPATIENT)
Dept: OBGYN | Facility: CLINIC | Age: 37
End: 2022-12-08
Payer: COMMERCIAL

## 2022-12-08 VITALS — SYSTOLIC BLOOD PRESSURE: 126 MMHG | WEIGHT: 238 LBS | BODY MASS INDEX: 39.61 KG/M2 | DIASTOLIC BLOOD PRESSURE: 80 MMHG

## 2022-12-08 DIAGNOSIS — Z01.419 ENCOUNTER FOR GYNECOLOGICAL EXAMINATION (GENERAL) (ROUTINE) W/OUT ABNORMAL FINDINGS: ICD-10-CM

## 2022-12-08 DIAGNOSIS — Z01.818 ENCOUNTER FOR OTHER PREPROCEDURAL EXAMINATION: ICD-10-CM

## 2022-12-08 DIAGNOSIS — Z09 ENCOUNTER FOR FOLLOW-UP EXAMINATION AFTER COMPLETED TREATMENT FOR CONDITIONS OTHER THAN MALIGNANT NEOPLASM: ICD-10-CM

## 2022-12-08 PROCEDURE — 99395 PREV VISIT EST AGE 18-39: CPT | Mod: 25

## 2022-12-08 PROCEDURE — 57500 BIOPSY OF CERVIX: CPT

## 2022-12-08 RX ORDER — AZITHROMYCIN 250 MG/1
250 TABLET, FILM COATED ORAL
Qty: 1 | Refills: 0 | Status: COMPLETED | COMMUNITY
Start: 2021-12-01 | End: 2022-12-08

## 2022-12-08 RX ORDER — AZITHROMYCIN 250 MG/1
250 TABLET, FILM COATED ORAL
Qty: 1 | Refills: 0 | Status: COMPLETED | COMMUNITY
Start: 2020-06-26 | End: 2022-12-08

## 2022-12-08 NOTE — COUNSELING
[Nutrition/ Exercise/ Weight Management] : nutrition, exercise, weight management [Body Image] : body image [Vitamins/Supplements] : vitamins/supplements [Breast Self Exam] : breast self exam [Contraception/ Emergency Contraception/ Safe Sexual Practices] : contraception, emergency contraception, safe sexual practices [Preconception Care/ Fertility options] : preconception care, fertility options [Pre/Post Op Instructions] : pre/post op instructions

## 2022-12-08 NOTE — PLAN
[FreeTextEntry1] : annual exam w poyp removal \par RTO for hysteroscpy\par f/u swabs\par \par discussed ama risk and fertility\par continue wt loss\par

## 2022-12-08 NOTE — HISTORY OF PRESENT ILLNESS
[FreeTextEntry1] : 36yo P1 LMP 11/20 here for annual exam w irregular bleeding  september\par h/o PCOS on ocp for BC\par \par no current complaints\par pt having dificulty loosing wt, working out 4 days a week\par \par Pt again considering fertility

## 2022-12-11 DIAGNOSIS — H10.9 UNSPECIFIED CONJUNCTIVITIS: ICD-10-CM

## 2022-12-11 RX ORDER — OFLOXACIN 3 MG/ML
0.3 SOLUTION/ DROPS OPHTHALMIC 4 TIMES DAILY
Qty: 1 | Refills: 0 | Status: ACTIVE | COMMUNITY
Start: 2022-12-11 | End: 1900-01-01

## 2022-12-12 ENCOUNTER — NON-APPOINTMENT (OUTPATIENT)
Age: 37
End: 2022-12-12

## 2022-12-12 LAB
C TRACH RRNA SPEC QL NAA+PROBE: NOT DETECTED
CANDIDA VAG CYTO: NOT DETECTED
G VAGINALIS+PREV SP MTYP VAG QL MICRO: DETECTED
N GONORRHOEA RRNA SPEC QL NAA+PROBE: NOT DETECTED
SOURCE AMPLIFICATION: NORMAL
T VAGINALIS VAG QL WET PREP: NOT DETECTED

## 2022-12-14 LAB — CORE LAB BIOPSY: NORMAL

## 2022-12-27 ENCOUNTER — APPOINTMENT (OUTPATIENT)
Dept: INTERNAL MEDICINE | Facility: CLINIC | Age: 37
End: 2022-12-27

## 2022-12-27 PROCEDURE — 99214 OFFICE O/P EST MOD 30 MIN: CPT | Mod: 95

## 2022-12-27 NOTE — ASSESSMENT
[FreeTextEntry1] : 36 year old female presents for annual exam.\par \par Asthma Exacerbation likely due to viral syndrome but cannot r/o CAP\par -start duoneb as directed \par -prednisone taper, \par -doxycycline as discussed \par -check covid/flu/rsv pcr\par -Nature of disease and treatments discussed at length.  \par -Advise given mild symptoms and no major risk factors to continue supportive care for now. \par -Advised to monitor oxygen level, call if level below 95%, develop chest pain, shortness of breath on exertion or lethargy\par -Increase fluids, \par -Follow-up as needed if symptoms not improved in  3-5 days, sooner if worsens.\par \par \par

## 2022-12-27 NOTE — HISTORY OF PRESENT ILLNESS
[Home] : at home, [unfilled] , at the time of the visit. [Medical Office: ([facility])___] : at the medical office located in  [Verbal consent obtained from patient] : the patient, [unfilled] [FreeTextEntry8] : 37 year old female presents complaining of cough and chest tightness that started yesterday.  similar symptoms 2 weeks ago which she is was treated in urgent care with a course of amoxicillin, prednisone.  States symptoms resolved restarted yesterday.  Mother at home with similar symptoms.  Denies any fever, chills, nausea, vomiting.  Cough is debilitating, persistent.  Home COVID test-negative.  Has been using albuterol and nebulizers, inhalers with some relief.  \par

## 2022-12-27 NOTE — REVIEW OF SYSTEMS
[Chills] : chills [Sore Throat] : sore throat [Postnasal Drip] : postnasal drip [Wheezing] : wheezing [Cough] : cough [Negative] : Neurological

## 2022-12-28 ENCOUNTER — RX RENEWAL (OUTPATIENT)
Age: 37
End: 2022-12-28

## 2023-01-04 ENCOUNTER — APPOINTMENT (OUTPATIENT)
Dept: OBGYN | Facility: CLINIC | Age: 38
End: 2023-01-04
Payer: COMMERCIAL

## 2023-01-04 VITALS
DIASTOLIC BLOOD PRESSURE: 83 MMHG | SYSTOLIC BLOOD PRESSURE: 127 MMHG | BODY MASS INDEX: 39.07 KG/M2 | WEIGHT: 234.5 LBS | HEIGHT: 65 IN

## 2023-01-04 PROCEDURE — 58555 HYSTEROSCOPY DX SEP PROC: CPT

## 2023-01-04 NOTE — PROCEDURE
[Hysteroscopy] : Hysteroscopy [Time out performed] : Pre-procedure time out performed.  Patient's name, date of birth and procedure confirmed. [Consent Obtained] : Consent obtained [Risks] : risks [Benefits] : benefits [Alternatives] : alternatives [Patient] : patient [Infection] : infection [Bleeding] : bleeding [Ibuprofen ___ mg] : ibuprofen [unfilled] ~Umg [Lidocaine___ mL] : [unfilled] ~UmL of lidocaine [rigid] : Using aseptic technique a hysteroscopy was performed using a rigid hysteroscope [Sent to Pathology] : specimen not sent for pathology [Antibiotics given] : antibiotics not given [Hemostasis obtained] : hemostasis obtained [Tolerated Well] : Patient tolerated the procedure well [de-identified] : eval for polyps [de-identified] : discussed hyst vs sonohysterogram [de-identified] : cleaned w bettadine, 10ml lidocain , single tooth tenaculum placed\par \par mid cycle endodmetrium, no polyps noted

## 2023-01-30 ENCOUNTER — NON-APPOINTMENT (OUTPATIENT)
Age: 38
End: 2023-01-30

## 2023-02-23 LAB — HPV HIGH+LOW RISK DNA PNL CVX: DETECTED

## 2023-03-14 ENCOUNTER — APPOINTMENT (OUTPATIENT)
Dept: PULMONOLOGY | Facility: CLINIC | Age: 38
End: 2023-03-14
Payer: COMMERCIAL

## 2023-03-14 VITALS
HEART RATE: 89 BPM | WEIGHT: 238 LBS | HEIGHT: 65 IN | TEMPERATURE: 97.6 F | BODY MASS INDEX: 39.65 KG/M2 | OXYGEN SATURATION: 98 % | SYSTOLIC BLOOD PRESSURE: 128 MMHG | DIASTOLIC BLOOD PRESSURE: 80 MMHG

## 2023-03-14 PROCEDURE — 99214 OFFICE O/P EST MOD 30 MIN: CPT | Mod: 25

## 2023-03-14 RX ORDER — FLUTICASONE FUROATE AND VILANTEROL TRIFENATATE 100; 25 UG/1; UG/1
100-25 POWDER RESPIRATORY (INHALATION)
Qty: 60 | Refills: 3 | Status: ACTIVE | COMMUNITY
Start: 2022-12-28 | End: 1900-01-01

## 2023-03-14 NOTE — HISTORY OF PRESENT ILLNESS
[Never] : never [TextBox_4] : Patient is a 37 year old female Hx asthma, COVID 19 virus infection,  presents to [facility] for follow up.  Patient has been using Breo-Ellipta 100 with good symptom control.  She experienced a URI over the holidays but is now feeling well. She is without increased respiratory complaints.

## 2023-03-14 NOTE — PROCEDURE
[FreeTextEntry1] : PFT 3/15/22 personally reviewed normal spirometry, normal lung volumes, moderate gas exchange abnormality corrects for alveolar volume

## 2023-03-14 NOTE — ASSESSMENT
[FreeTextEntry1] : 37 year old female Hx asthma presents for follow up\par \par Continue Breo Ellipta 100 daily as directed\par Rescue Albuterol 2 puffs if needed\par PFT next visit \par \par Follow up with PFT

## 2023-04-17 ENCOUNTER — NON-APPOINTMENT (OUTPATIENT)
Age: 38
End: 2023-04-17

## 2023-04-17 ENCOUNTER — APPOINTMENT (OUTPATIENT)
Dept: INTERNAL MEDICINE | Facility: CLINIC | Age: 38
End: 2023-04-17
Payer: COMMERCIAL

## 2023-04-17 VITALS
HEART RATE: 77 BPM | HEIGHT: 65 IN | WEIGHT: 238 LBS | SYSTOLIC BLOOD PRESSURE: 109 MMHG | BODY MASS INDEX: 39.65 KG/M2 | DIASTOLIC BLOOD PRESSURE: 74 MMHG

## 2023-04-17 PROCEDURE — 93000 ELECTROCARDIOGRAM COMPLETE: CPT | Mod: 59

## 2023-04-17 PROCEDURE — 99395 PREV VISIT EST AGE 18-39: CPT | Mod: 25

## 2023-04-17 PROCEDURE — 36415 COLL VENOUS BLD VENIPUNCTURE: CPT

## 2023-04-17 RX ORDER — DOXYCYCLINE HYCLATE 100 MG/1
100 TABLET ORAL
Qty: 20 | Refills: 0 | Status: DISCONTINUED | COMMUNITY
Start: 2022-12-27 | End: 2023-04-17

## 2023-04-17 RX ORDER — METRONIDAZOLE 500 MG/1
500 TABLET ORAL TWICE DAILY
Qty: 14 | Refills: 2 | Status: DISCONTINUED | COMMUNITY
Start: 2022-12-12 | End: 2023-04-17

## 2023-04-17 RX ORDER — FLUCONAZOLE 150 MG/1
150 TABLET ORAL
Qty: 2 | Refills: 1 | Status: DISCONTINUED | COMMUNITY
Start: 2022-12-12 | End: 2023-04-17

## 2023-04-17 RX ORDER — SEMAGLUTIDE 1.34 MG/ML
2 INJECTION, SOLUTION SUBCUTANEOUS
Qty: 1 | Refills: 3 | Status: DISCONTINUED | COMMUNITY
Start: 2022-04-04 | End: 2023-04-17

## 2023-04-18 NOTE — HISTORY OF PRESENT ILLNESS
[de-identified] : 37 year old female with asthma presents for annual exam.\par \par Main concern today continues to be her weight gain despite diet and exercise.  Did try course of Contrave for about 3 months, no results states has invested in Peloton and has been using it daily.  Has tried different diets in the past But Unfortunately got discouraged and has been stress eating at times.  Gained about 15 to 20 pounds in the last year.  Works mostly sedentary.  Has tried orlistat for the last year with no major change.  Was seen by nutritionist,  in the past with no major change\par \par , 1 child\par Employed-\par Non-smoker\par \par \par

## 2023-04-18 NOTE — HEALTH RISK ASSESSMENT
[PHQ-2 Negative - No further assessment needed] : PHQ-2 Negative - No further assessment needed [Patient reported PAP Smear was normal] : Patient reported PAP Smear was normal [Fully functional (bathing, dressing, toileting, transferring, walking, feeding)] : Fully functional (bathing, dressing, toileting, transferring, walking, feeding) [Fully functional (using the telephone, shopping, preparing meals, housekeeping, doing laundry, using] : Fully functional and needs no help or supervision to perform IADLs (using the telephone, shopping, preparing meals, housekeeping, doing laundry, using transportation, managing medications and managing finances) [Never] : Never [[id number]] : 0 [Change in mental status noted] : No change in mental status noted [Reports changes in hearing] : Reports no changes in hearing [Sexually Active] : not sexually active [Reports changes in vision] : Reports no changes in vision [PapSmearDate] : 2022

## 2023-04-18 NOTE — ASSESSMENT
[FreeTextEntry1] : 37 year old female with h/o asthma  presents for annual exam.\par \par Asthma, controlled on breo\par -Albuterol as needed\par -f/u as per pulm \par \par \par Obesity, no major change despite healthy diet and exercise\par -wegovy as directed, risks and benefits of medication discussed in detail with the\par -states in the last 12 months has been seen by nutritionist,  and attended weight loss programs with no reduction in weight. has made necessary modifications to her diet and been exercising frequently with some weigh loss but has then plateaued.  This has contributed to her elevated BP.  has tried Uristat and Contrave  but was unable to tolerate to side effects.\par \par Healthcare Maintenance\par -Advise Yearly Skin cancer screening with Dermatologist \par -Advise Yearly Eye exam with Ophthalmologist\par -Advise Yearly Dental exam\par -Educated of the importance of Healthy diet, such as Mediterranean Diet and Exercise, such as walking >20 minutes a day and increasing gradually as tolerated\par \par Immunizations\par -Flu vaccine \par -Covid vaccine \par \par Preventative screening \par -advised to get PAP for cervical cancer screening -up to date \par \par Follow-up in 6 weeks for weight check\par \par \par \par \par \par \par

## 2023-04-18 NOTE — PHYSICAL EXAM
[Normal] : normal rate, regular rhythm, normal S1 and S2 and no murmur heard [Pedal Pulses Present] : the pedal pulses are present [No Edema] : there was no peripheral edema [No Extremity Clubbing/Cyanosis] : no extremity clubbing/cyanosis [Soft] : abdomen soft [Non Tender] : non-tender [Non-distended] : non-distended [No HSM] : no HSM [Normal Posterior Cervical Nodes] : no posterior cervical lymphadenopathy [Normal Anterior Cervical Nodes] : no anterior cervical lymphadenopathy [No CVA Tenderness] : no CVA  tenderness [No Joint Swelling] : no joint swelling [No Rash] : no rash [No Focal Deficits] : no focal deficits [Normal Affect] : the affect was normal [Normal Mood] : the mood was normal

## 2023-04-19 ENCOUNTER — CLINICAL ADVICE (OUTPATIENT)
Age: 38
End: 2023-04-19

## 2023-04-19 LAB
ALBUMIN SERPL ELPH-MCNC: 4.2 G/DL
ALP BLD-CCNC: 59 U/L
ALT SERPL-CCNC: 16 U/L
ANION GAP SERPL CALC-SCNC: 10 MMOL/L
APPEARANCE: ABNORMAL
AST SERPL-CCNC: 12 U/L
BACTERIA: ABNORMAL
BASOPHILS # BLD AUTO: 0.07 K/UL
BASOPHILS NFR BLD AUTO: 0.8 %
BILIRUB DIRECT SERPL-MCNC: 0.1 MG/DL
BILIRUB INDIRECT SERPL-MCNC: 0.1 MG/DL
BILIRUB SERPL-MCNC: 0.2 MG/DL
BILIRUBIN URINE: NEGATIVE
BLOOD URINE: ABNORMAL
BUN SERPL-MCNC: 9 MG/DL
CALCIUM SERPL-MCNC: 9.4 MG/DL
CHLORIDE SERPL-SCNC: 104 MMOL/L
CHOLEST SERPL-MCNC: 217 MG/DL
CO2 SERPL-SCNC: 26 MMOL/L
COLOR: YELLOW
CREAT SERPL-MCNC: 0.67 MG/DL
EGFR: 115 ML/MIN/1.73M2
EOSINOPHIL # BLD AUTO: 0.11 K/UL
EOSINOPHIL NFR BLD AUTO: 1.2 %
ESTIMATED AVERAGE GLUCOSE: 111 MG/DL
FERRITIN SERPL-MCNC: 27 NG/ML
FOLATE SERPL-MCNC: 8 NG/ML
GLUCOSE QUALITATIVE U: NEGATIVE
GLUCOSE SERPL-MCNC: 87 MG/DL
HBA1C MFR BLD HPLC: 5.5 %
HCT VFR BLD CALC: 42.6 %
HDLC SERPL-MCNC: 40 MG/DL
HGB BLD-MCNC: 13 G/DL
HYALINE CASTS: 3 /LPF
IMM GRANULOCYTES NFR BLD AUTO: 0.2 %
IRON SATN MFR SERPL: 8 %
IRON SERPL-MCNC: 31 UG/DL
KETONES URINE: NEGATIVE
LDLC SERPL CALC-MCNC: 144 MG/DL
LEUKOCYTE ESTERASE URINE: NEGATIVE
LYMPHOCYTES # BLD AUTO: 2.93 K/UL
LYMPHOCYTES NFR BLD AUTO: 32.8 %
MAN DIFF?: NORMAL
MCHC RBC-ENTMCNC: 27.9 PG
MCHC RBC-ENTMCNC: 30.5 GM/DL
MCV RBC AUTO: 91.4 FL
MICROSCOPIC-UA: NORMAL
MONOCYTES # BLD AUTO: 0.53 K/UL
MONOCYTES NFR BLD AUTO: 5.9 %
NEUTROPHILS # BLD AUTO: 5.27 K/UL
NEUTROPHILS NFR BLD AUTO: 59.1 %
NITRITE URINE: NEGATIVE
NONHDLC SERPL-MCNC: 177 MG/DL
PH URINE: 6.5
PLATELET # BLD AUTO: 371 K/UL
POTASSIUM SERPL-SCNC: 4.5 MMOL/L
PROT SERPL-MCNC: 6.9 G/DL
PROTEIN URINE: NORMAL
RBC # BLD: 4.66 M/UL
RBC # FLD: 13.1 %
RED BLOOD CELLS URINE: 3 /HPF
SODIUM SERPL-SCNC: 140 MMOL/L
SPECIFIC GRAVITY URINE: 1.02
SQUAMOUS EPITHELIAL CELLS: 21 /HPF
TIBC SERPL-MCNC: 401 UG/DL
TRIGL SERPL-MCNC: 162 MG/DL
TSH SERPL-ACNC: 0.78 UIU/ML
UIBC SERPL-MCNC: 369 UG/DL
UROBILINOGEN URINE: NORMAL
VIT B12 SERPL-MCNC: 449 PG/ML
WBC # FLD AUTO: 8.93 K/UL
WHITE BLOOD CELLS URINE: 5 /HPF

## 2023-05-02 ENCOUNTER — RX RENEWAL (OUTPATIENT)
Age: 38
End: 2023-05-02

## 2023-05-02 ENCOUNTER — CLINICAL ADVICE (OUTPATIENT)
Age: 38
End: 2023-05-02

## 2023-05-05 LAB — CYTOLOGY CVX/VAG DOC THIN PREP: ABNORMAL

## 2023-05-15 ENCOUNTER — RX RENEWAL (OUTPATIENT)
Age: 38
End: 2023-05-15

## 2023-06-28 ENCOUNTER — RX RENEWAL (OUTPATIENT)
Age: 38
End: 2023-06-28

## 2023-08-07 ENCOUNTER — RX RENEWAL (OUTPATIENT)
Age: 38
End: 2023-08-07

## 2023-08-25 ENCOUNTER — APPOINTMENT (OUTPATIENT)
Dept: PULMONOLOGY | Facility: CLINIC | Age: 38
End: 2023-08-25
Payer: COMMERCIAL

## 2023-08-25 VITALS
HEIGHT: 65 IN | TEMPERATURE: 98.3 F | DIASTOLIC BLOOD PRESSURE: 80 MMHG | OXYGEN SATURATION: 98 % | HEART RATE: 68 BPM | WEIGHT: 233 LBS | SYSTOLIC BLOOD PRESSURE: 118 MMHG | BODY MASS INDEX: 38.82 KG/M2

## 2023-08-25 PROCEDURE — 96372 THER/PROPH/DIAG INJ SC/IM: CPT

## 2023-08-25 PROCEDURE — 99214 OFFICE O/P EST MOD 30 MIN: CPT | Mod: 25

## 2023-08-25 RX ORDER — LEVALBUTEROL HYDROCHLORIDE 0.63 MG/3ML
0.63 SOLUTION RESPIRATORY (INHALATION)
Qty: 0 | Refills: 0 | Status: COMPLETED | OUTPATIENT
Start: 2023-08-25

## 2023-08-25 RX ORDER — ALBUTEROL SULFATE 90 UG/1
108 (90 BASE) INHALANT RESPIRATORY (INHALATION)
Qty: 1 | Refills: 3 | Status: ACTIVE | COMMUNITY
Start: 2020-06-10 | End: 1900-01-01

## 2023-08-25 RX ORDER — IPRATROPIUM BROMIDE AND ALBUTEROL SULFATE 2.5; .5 MG/3ML; MG/3ML
0.5-2.5 (3) SOLUTION RESPIRATORY (INHALATION)
Qty: 1 | Refills: 1 | Status: ACTIVE | COMMUNITY
Start: 2022-12-27 | End: 1900-01-01

## 2023-08-25 RX ORDER — METHYLPRED ACET/NACL,ISO-OS/PF 40 MG/ML
40 VIAL (ML) INJECTION
Qty: 1 | Refills: 0 | Status: COMPLETED | OUTPATIENT
Start: 2023-08-25

## 2023-08-25 RX ORDER — FLUTICASONE FUROATE AND VILANTEROL TRIFENATATE 100; 25 UG/1; UG/1
100-25 POWDER RESPIRATORY (INHALATION) DAILY
Qty: 1 | Refills: 1 | Status: DISCONTINUED | COMMUNITY
Start: 2022-03-15 | End: 2023-08-25

## 2023-08-25 RX ORDER — PREDNISONE 10 MG/1
10 TABLET ORAL
Qty: 55 | Refills: 0 | Status: DISCONTINUED | COMMUNITY
Start: 2022-12-27 | End: 2023-08-25

## 2023-08-25 RX ADMIN — LEVALBUTEROL HYDROCHLORIDE 0 MG/3ML: 0.63 SOLUTION RESPIRATORY (INHALATION) at 00:00

## 2023-08-25 RX ADMIN — METHYLPREDNISOLONE ACETATE 1 MG/ML: 40 INJECTION, SUSPENSION INTRA-ARTICULAR; INTRALESIONAL; INTRAMUSCULAR; SOFT TISSUE at 00:00

## 2023-08-25 NOTE — ASSESSMENT
[FreeTextEntry1] : 37 year old female Hx asthma presents for follow up, now with acute asthma exacerbation  Solu Medrol 40 mg IM given in office  Nebulized Albuterol x 1 given in office  Prednisone 40 mg daily x 7 days Nebulized Albuterol/Ipratropium every 4-6 hours as needed  Continue Breo Ellipta 100 daily as directed Rescue Albuterol 2 puffs if needed PFT when at baseline  Follow up 2-4 weeks

## 2023-08-25 NOTE — HISTORY OF PRESENT ILLNESS
[Never] : never [TextBox_4] : Patient is a 37 year old female Hx asthma, COVID 19 virus infection,  presents to [facility] for follow up. Patient began feeling poorly approximately 2 days ago.  She experienced increased cough and shortness of breath.  She is wheezing.  She is here for these symptoms.  Patient denies fever, chills hemoptysis or other systemic complaints.

## 2023-09-28 ENCOUNTER — APPOINTMENT (OUTPATIENT)
Dept: INTERNAL MEDICINE | Facility: CLINIC | Age: 38
End: 2023-09-28
Payer: COMMERCIAL

## 2023-09-28 ENCOUNTER — APPOINTMENT (OUTPATIENT)
Dept: PULMONOLOGY | Facility: CLINIC | Age: 38
End: 2023-09-28
Payer: COMMERCIAL

## 2023-09-28 VITALS
SYSTOLIC BLOOD PRESSURE: 124 MMHG | DIASTOLIC BLOOD PRESSURE: 80 MMHG | BODY MASS INDEX: 40.15 KG/M2 | OXYGEN SATURATION: 98 % | TEMPERATURE: 98.6 F | HEIGHT: 65 IN | HEART RATE: 101 BPM | WEIGHT: 241 LBS

## 2023-09-28 DIAGNOSIS — R94.2 ABNORMAL RESULTS OF PULMONARY FUNCTION STUDIES: ICD-10-CM

## 2023-09-28 PROCEDURE — 94060 EVALUATION OF WHEEZING: CPT

## 2023-09-28 PROCEDURE — 99214 OFFICE O/P EST MOD 30 MIN: CPT | Mod: 25

## 2023-09-28 PROCEDURE — 94729 DIFFUSING CAPACITY: CPT

## 2023-09-28 PROCEDURE — 94726 PLETHYSMOGRAPHY LUNG VOLUMES: CPT

## 2023-10-02 ENCOUNTER — NON-APPOINTMENT (OUTPATIENT)
Age: 38
End: 2023-10-02

## 2023-10-02 DIAGNOSIS — Z30.011 ENCOUNTER FOR INITIAL PRESCRIPTION OF CONTRACEPTIVE PILLS: ICD-10-CM

## 2023-10-10 ENCOUNTER — OUTPATIENT (OUTPATIENT)
Dept: OUTPATIENT SERVICES | Facility: HOSPITAL | Age: 38
LOS: 1 days | End: 2023-10-10
Payer: COMMERCIAL

## 2023-10-10 ENCOUNTER — APPOINTMENT (OUTPATIENT)
Dept: CT IMAGING | Facility: CLINIC | Age: 38
End: 2023-10-10
Payer: COMMERCIAL

## 2023-10-10 DIAGNOSIS — Z98.891 HISTORY OF UTERINE SCAR FROM PREVIOUS SURGERY: Chronic | ICD-10-CM

## 2023-10-10 DIAGNOSIS — R05.3 CHRONIC COUGH: ICD-10-CM

## 2023-10-10 PROCEDURE — 71250 CT THORAX DX C-: CPT

## 2023-10-10 PROCEDURE — 71250 CT THORAX DX C-: CPT | Mod: 26

## 2023-10-31 ENCOUNTER — APPOINTMENT (OUTPATIENT)
Dept: PULMONOLOGY | Facility: CLINIC | Age: 38
End: 2023-10-31
Payer: COMMERCIAL

## 2023-10-31 VITALS
HEIGHT: 65 IN | HEART RATE: 92 BPM | SYSTOLIC BLOOD PRESSURE: 122 MMHG | BODY MASS INDEX: 40.65 KG/M2 | OXYGEN SATURATION: 98 % | DIASTOLIC BLOOD PRESSURE: 80 MMHG | WEIGHT: 244 LBS | TEMPERATURE: 97.4 F

## 2023-10-31 DIAGNOSIS — R05.3 CHRONIC COUGH: ICD-10-CM

## 2023-10-31 PROCEDURE — 99214 OFFICE O/P EST MOD 30 MIN: CPT | Mod: 25

## 2023-11-28 ENCOUNTER — APPOINTMENT (OUTPATIENT)
Dept: THORACIC SURGERY | Facility: CLINIC | Age: 38
End: 2023-11-28
Payer: COMMERCIAL

## 2023-11-28 VITALS
HEART RATE: 78 BPM | RESPIRATION RATE: 17 BRPM | SYSTOLIC BLOOD PRESSURE: 116 MMHG | OXYGEN SATURATION: 100 % | WEIGHT: 240 LBS | DIASTOLIC BLOOD PRESSURE: 79 MMHG | HEIGHT: 65 IN | BODY MASS INDEX: 39.99 KG/M2

## 2023-11-28 PROCEDURE — 99205 OFFICE O/P NEW HI 60 MIN: CPT

## 2023-11-29 ENCOUNTER — NON-APPOINTMENT (OUTPATIENT)
Age: 38
End: 2023-11-29

## 2023-12-05 ENCOUNTER — OUTPATIENT (OUTPATIENT)
Dept: OUTPATIENT SERVICES | Facility: HOSPITAL | Age: 38
LOS: 1 days | End: 2023-12-05

## 2023-12-05 VITALS
SYSTOLIC BLOOD PRESSURE: 115 MMHG | WEIGHT: 240.97 LBS | OXYGEN SATURATION: 96 % | HEART RATE: 74 BPM | RESPIRATION RATE: 16 BRPM | TEMPERATURE: 98 F | DIASTOLIC BLOOD PRESSURE: 77 MMHG | HEIGHT: 65 IN

## 2023-12-05 DIAGNOSIS — Z86.39 PERSONAL HISTORY OF OTHER ENDOCRINE, NUTRITIONAL AND METABOLIC DISEASE: ICD-10-CM

## 2023-12-05 DIAGNOSIS — J39.8 OTHER SPECIFIED DISEASES OF UPPER RESPIRATORY TRACT: ICD-10-CM

## 2023-12-05 DIAGNOSIS — Z98.891 HISTORY OF UTERINE SCAR FROM PREVIOUS SURGERY: Chronic | ICD-10-CM

## 2023-12-05 LAB
ANION GAP SERPL CALC-SCNC: 12 MMOL/L — SIGNIFICANT CHANGE UP (ref 7–14)
ANION GAP SERPL CALC-SCNC: 12 MMOL/L — SIGNIFICANT CHANGE UP (ref 7–14)
BUN SERPL-MCNC: 12 MG/DL — SIGNIFICANT CHANGE UP (ref 7–23)
BUN SERPL-MCNC: 12 MG/DL — SIGNIFICANT CHANGE UP (ref 7–23)
CALCIUM SERPL-MCNC: 9 MG/DL — SIGNIFICANT CHANGE UP (ref 8.4–10.5)
CALCIUM SERPL-MCNC: 9 MG/DL — SIGNIFICANT CHANGE UP (ref 8.4–10.5)
CHLORIDE SERPL-SCNC: 101 MMOL/L — SIGNIFICANT CHANGE UP (ref 98–107)
CHLORIDE SERPL-SCNC: 101 MMOL/L — SIGNIFICANT CHANGE UP (ref 98–107)
CO2 SERPL-SCNC: 25 MMOL/L — SIGNIFICANT CHANGE UP (ref 22–31)
CO2 SERPL-SCNC: 25 MMOL/L — SIGNIFICANT CHANGE UP (ref 22–31)
CREAT SERPL-MCNC: 0.77 MG/DL — SIGNIFICANT CHANGE UP (ref 0.5–1.3)
CREAT SERPL-MCNC: 0.77 MG/DL — SIGNIFICANT CHANGE UP (ref 0.5–1.3)
EGFR: 101 ML/MIN/1.73M2 — SIGNIFICANT CHANGE UP
EGFR: 101 ML/MIN/1.73M2 — SIGNIFICANT CHANGE UP
GLUCOSE SERPL-MCNC: 77 MG/DL — SIGNIFICANT CHANGE UP (ref 70–99)
GLUCOSE SERPL-MCNC: 77 MG/DL — SIGNIFICANT CHANGE UP (ref 70–99)
HCG SERPL-ACNC: <1 MIU/ML — SIGNIFICANT CHANGE UP
HCG SERPL-ACNC: <1 MIU/ML — SIGNIFICANT CHANGE UP
HCT VFR BLD CALC: 41.2 % — SIGNIFICANT CHANGE UP (ref 34.5–45)
HCT VFR BLD CALC: 41.2 % — SIGNIFICANT CHANGE UP (ref 34.5–45)
HGB BLD-MCNC: 13 G/DL — SIGNIFICANT CHANGE UP (ref 11.5–15.5)
HGB BLD-MCNC: 13 G/DL — SIGNIFICANT CHANGE UP (ref 11.5–15.5)
MCHC RBC-ENTMCNC: 26.9 PG — LOW (ref 27–34)
MCHC RBC-ENTMCNC: 26.9 PG — LOW (ref 27–34)
MCHC RBC-ENTMCNC: 31.6 GM/DL — LOW (ref 32–36)
MCHC RBC-ENTMCNC: 31.6 GM/DL — LOW (ref 32–36)
MCV RBC AUTO: 85.1 FL — SIGNIFICANT CHANGE UP (ref 80–100)
MCV RBC AUTO: 85.1 FL — SIGNIFICANT CHANGE UP (ref 80–100)
NRBC # BLD: 0 /100 WBCS — SIGNIFICANT CHANGE UP (ref 0–0)
NRBC # BLD: 0 /100 WBCS — SIGNIFICANT CHANGE UP (ref 0–0)
NRBC # FLD: 0 K/UL — SIGNIFICANT CHANGE UP (ref 0–0)
NRBC # FLD: 0 K/UL — SIGNIFICANT CHANGE UP (ref 0–0)
PLATELET # BLD AUTO: 408 K/UL — HIGH (ref 150–400)
PLATELET # BLD AUTO: 408 K/UL — HIGH (ref 150–400)
POTASSIUM SERPL-MCNC: 3.9 MMOL/L — SIGNIFICANT CHANGE UP (ref 3.5–5.3)
POTASSIUM SERPL-MCNC: 3.9 MMOL/L — SIGNIFICANT CHANGE UP (ref 3.5–5.3)
POTASSIUM SERPL-SCNC: 3.9 MMOL/L — SIGNIFICANT CHANGE UP (ref 3.5–5.3)
POTASSIUM SERPL-SCNC: 3.9 MMOL/L — SIGNIFICANT CHANGE UP (ref 3.5–5.3)
RBC # BLD: 4.84 M/UL — SIGNIFICANT CHANGE UP (ref 3.8–5.2)
RBC # BLD: 4.84 M/UL — SIGNIFICANT CHANGE UP (ref 3.8–5.2)
RBC # FLD: 13.9 % — SIGNIFICANT CHANGE UP (ref 10.3–14.5)
RBC # FLD: 13.9 % — SIGNIFICANT CHANGE UP (ref 10.3–14.5)
SODIUM SERPL-SCNC: 138 MMOL/L — SIGNIFICANT CHANGE UP (ref 135–145)
SODIUM SERPL-SCNC: 138 MMOL/L — SIGNIFICANT CHANGE UP (ref 135–145)
WBC # BLD: 10.94 K/UL — HIGH (ref 3.8–10.5)
WBC # BLD: 10.94 K/UL — HIGH (ref 3.8–10.5)
WBC # FLD AUTO: 10.94 K/UL — HIGH (ref 3.8–10.5)
WBC # FLD AUTO: 10.94 K/UL — HIGH (ref 3.8–10.5)

## 2023-12-05 RX ORDER — ALBUTEROL 90 UG/1
0 AEROSOL, METERED ORAL
Qty: 0 | Refills: 0 | DISCHARGE

## 2023-12-05 RX ORDER — NORETHINDRONE AND ETHINYL ESTRADIOL 0.4-0.035
1 KIT ORAL
Qty: 0 | Refills: 0 | DISCHARGE

## 2023-12-05 NOTE — H&P PST ADULT - HISTORY OF PRESENT ILLNESS
39 y/o female PMH asthma presents to presurgical testing with diagnosis of other specified diseases of upper respiratory tract. Pt with h/o of persistent cough. CT chest revealing a tracheobronchomalacia. Pt is scheduled for a flexible awake bronchoscopy.

## 2023-12-05 NOTE — H&P PST ADULT - PROBLEM SELECTOR PLAN 2
Pt states procedure was rescheduled for 12/12/23. Pt instructed to hold Monday dose of Wegovy. Pt instructed to hold Monday 12/11 dose of Wegovy.

## 2023-12-05 NOTE — H&P PST ADULT - EYES
Learning About When to Call Your Doctor During Pregnancy (After 20 Weeks)  Overview  It's common to have concerns about what might be a problem when you're pregnant. Most pregnancies don't have any serious problems. But it's still important to know when to call your doctor if you have certain symptoms or signs of labor. These are general suggestions. Your doctor may give you some more information about when to call. When to call your doctor (after 20 weeks)  Call 911  anytime you think you may need emergency care. For example, call if:  You have severe vaginal bleeding. This means you are soaking through a pad each hour for 2 or more hours. You have sudden, severe pain in your belly. You have chest pain, are short of breath, or cough up blood. You passed out (lost consciousness). You have a seizure. You see or feel the umbilical cord. You think you are about to deliver your baby and can't make it safely to the hospital or birthing center. Call your doctor now or seek immediate medical care if:  You have vaginal bleeding. You have belly pain. You have a fever. You are dizzy or lightheaded, or you feel like you may faint. You have signs of a blood clot in your leg (called a deep vein thrombosis), such as:  Pain in the calf, back of the knee, thigh, or groin. Swelling in your leg or groin. A color change on the leg or groin. The skin may be reddish or purplish, depending on your usual skin color. You have symptoms of preeclampsia, such as:  Sudden swelling of your face, hands, or feet. New vision problems (such as dimness, blurring, or seeing spots). A severe headache. You have a sudden release of fluid from your vagina. (You think your water broke.)  You've been having regular contractions for an hour. This means that you've had at least 6 contractions within 1 hour, even after you change your position and drink fluids.   You notice that your baby has stopped moving or is moving less than
PERRL/EOMI/conjunctiva clear/normal

## 2023-12-05 NOTE — H&P PST ADULT - PROBLEM SELECTOR PLAN 1
Patient tentatively scheduled for Flexible awake bronchoscopy for 12/12/23 as per patient. Pre-op instructions provided. Pt given verbal and written instructions with teach back on pepcid. Pt verbalized understanding with return demonstration.     CBC BMP HCG done.

## 2023-12-11 ENCOUNTER — TRANSCRIPTION ENCOUNTER (OUTPATIENT)
Age: 38
End: 2023-12-11

## 2023-12-11 NOTE — ASU PATIENT PROFILE, ADULT - FALL HARM RISK - UNIVERSAL INTERVENTIONS
Bed in lowest position, wheels locked, appropriate side rails in place/Call bell, personal items and telephone in reach/Instruct patient to call for assistance before getting out of bed or chair/Non-slip footwear when patient is out of bed/[place] to call system/Physically safe environment - no spills, clutter or unnecessary equipment/Purposeful Proactive Rounding/Room/bathroom lighting operational, light cord in reach Bed in lowest position, wheels locked, appropriate side rails in place/Call bell, personal items and telephone in reach/Instruct patient to call for assistance before getting out of bed or chair/Non-slip footwear when patient is out of bed/[place] to call system/Physically safe environment - no spills, clutter or unnecessary equipment/Purposeful Proactive Rounding/Room/bathroom lighting operational, light cord in reach

## 2023-12-12 ENCOUNTER — OUTPATIENT (OUTPATIENT)
Dept: OUTPATIENT SERVICES | Facility: HOSPITAL | Age: 38
LOS: 1 days | Discharge: HOME | End: 2023-12-12
Payer: COMMERCIAL

## 2023-12-12 ENCOUNTER — APPOINTMENT (OUTPATIENT)
Dept: THORACIC SURGERY | Facility: HOSPITAL | Age: 38
End: 2023-12-12

## 2023-12-12 ENCOUNTER — TRANSCRIPTION ENCOUNTER (OUTPATIENT)
Age: 38
End: 2023-12-12

## 2023-12-12 VITALS
WEIGHT: 240.08 LBS | HEIGHT: 65 IN | OXYGEN SATURATION: 98 % | DIASTOLIC BLOOD PRESSURE: 68 MMHG | RESPIRATION RATE: 16 BRPM | TEMPERATURE: 98 F | HEART RATE: 89 BPM | SYSTOLIC BLOOD PRESSURE: 121 MMHG

## 2023-12-12 VITALS
SYSTOLIC BLOOD PRESSURE: 109 MMHG | OXYGEN SATURATION: 95 % | TEMPERATURE: 97 F | HEART RATE: 91 BPM | RESPIRATION RATE: 16 BRPM | DIASTOLIC BLOOD PRESSURE: 64 MMHG

## 2023-12-12 DIAGNOSIS — J39.8 OTHER SPECIFIED DISEASES OF UPPER RESPIRATORY TRACT: ICD-10-CM

## 2023-12-12 DIAGNOSIS — Z98.891 HISTORY OF UTERINE SCAR FROM PREVIOUS SURGERY: Chronic | ICD-10-CM

## 2023-12-12 LAB
HCG UR QL: NEGATIVE — SIGNIFICANT CHANGE UP
HCG UR QL: NEGATIVE — SIGNIFICANT CHANGE UP

## 2023-12-12 PROCEDURE — 31622 DX BRONCHOSCOPE/WASH: CPT

## 2023-12-12 RX ORDER — FENTANYL CITRATE 50 UG/ML
25 INJECTION INTRAVENOUS
Refills: 0 | Status: DISCONTINUED | OUTPATIENT
Start: 2023-12-12 | End: 2023-12-12

## 2023-12-12 RX ORDER — SEMAGLUTIDE 0.68 MG/ML
0.5 INJECTION, SOLUTION SUBCUTANEOUS
Refills: 0 | DISCHARGE

## 2023-12-12 RX ORDER — NORETHINDRONE AND ETHINYL ESTRADIOL 0.4-0.035
1 KIT ORAL
Refills: 0 | DISCHARGE

## 2023-12-12 RX ORDER — ONDANSETRON 8 MG/1
4 TABLET, FILM COATED ORAL ONCE
Refills: 0 | Status: DISCONTINUED | OUTPATIENT
Start: 2023-12-12 | End: 2023-12-26

## 2023-12-12 NOTE — ASU DISCHARGE PLAN (ADULT/PEDIATRIC) - CARE PROVIDER_API CALL
Tyson Pruitt  Thoracic Surgery  [address] [place] [address], [address] [place] [address]  Phone: [phone]  Fax: [phone]  Follow Up Time: 2 weeks   Tyson Pruitt  Thoracic Surgery  [address] [place] [address], [address] [place] [address]  Phone: [phone]  Fax: [phone]  Follow Up Time: 2 weeks

## 2023-12-12 NOTE — ASU DISCHARGE PLAN (ADULT/PEDIATRIC) - PROVIDER TOKENS
PROVIDER:[TOKEN:[[id number]],FOLLOWUP:[2 weeks]] PROVIDER:[TOKEN:[[id number]],FOLLOWUP:[2 weeks]]

## 2023-12-12 NOTE — ASU DISCHARGE PLAN (ADULT/PEDIATRIC) - FOLLOW UP APPOINTMENTS
[facility], [facility], [facility]

## 2023-12-12 NOTE — ASU DISCHARGE PLAN (ADULT/PEDIATRIC) - NS MD DC FALL RISK RISK
For information on Fall & Injury Prevention, visit: [email or web address] OR  [email or web address] OR  https://www.cdc.gov/steadi/patient.html For information on Fall & Injury Prevention, visit: [email or web address] OR  [email or web address]-[email or web address] OR  https://www.cdc.gov/steadi/patient.html

## 2023-12-12 NOTE — ASU DISCHARGE PLAN (ADULT/PEDIATRIC) - POST OP PHONE #
[phone] pt. granted permission to leave message /and or speak with whoever answers the phone. [phone] pt. granted permission to leave message /and or speak with whoever answers the phone.

## 2024-01-18 PROBLEM — J39.8 OTHER SPECIFIED DISEASES OF UPPER RESPIRATORY TRACT: Chronic | Status: ACTIVE | Noted: 2023-12-05

## 2024-01-19 ENCOUNTER — APPOINTMENT (OUTPATIENT)
Dept: PULMONOLOGY | Facility: CLINIC | Age: 39
End: 2024-01-19

## 2024-01-22 ENCOUNTER — APPOINTMENT (OUTPATIENT)
Dept: PULMONOLOGY | Facility: CLINIC | Age: 39
End: 2024-01-22
Payer: COMMERCIAL

## 2024-01-22 DIAGNOSIS — J39.8 OTHER SPECIFIED DISEASES OF UPPER RESPIRATORY TRACT: ICD-10-CM

## 2024-01-22 PROCEDURE — 99443: CPT

## 2024-01-22 NOTE — PROCEDURE
[FreeTextEntry1] : PFT 3/15/22 personally reviewed normal spirometry, normal lung volumes, moderate gas exchange abnormality corrects for alveolar volume  CT chest 10/10/23 personally reviewed near complete collapse of the trachea and bilateral bronchi in expiration representing tracheobronchomalacia

## 2024-01-22 NOTE — ASSESSMENT
[FreeTextEntry1] : 38 year old female Hx asthma presents for follow up tracheobronchomalacia  Results of dynamic bronchoscopy discussed to best of my ability.  All questions answered  Referrals to [facility], [facility] and [facility] for evaluation and treatment of Tracheobronchomalacia  Patient expressed understanding and agrees with plan of care.  Time spent in coordinating Care greater than 30 minuytes

## 2024-01-22 NOTE — HISTORY OF PRESENT ILLNESS
[Never] : never [TextBox_4] : Patient is a 38 year old female Hx asthma, COVID 19 virus infection,  presents to [facility] for follow up persistent  cough post COVID.  Patient underwent Dynamic CT chest revealing tracheobronchomalacia.  Patient underwent dynamic CT and reported "severe collapse>'. She was not seen in follow up by Thoracic Surgery.  She is here for follow upand plan of care.

## 2024-01-22 NOTE — REASON FOR VISIT
[Follow-Up] : a follow-up visit [Abnormal CXR/ Chest CT] : an abnormal CXR/ chest CT [Asthma] : asthma [TextBox_44] : Tracheobronchomalacia/Telephone follow up bronchoscopy

## 2024-01-23 ENCOUNTER — APPOINTMENT (OUTPATIENT)
Dept: PULMONOLOGY | Facility: CLINIC | Age: 39
End: 2024-01-23

## 2024-01-25 ENCOUNTER — APPOINTMENT (OUTPATIENT)
Dept: OBGYN | Facility: CLINIC | Age: 39
End: 2024-01-25
Payer: COMMERCIAL

## 2024-01-25 VITALS
HEIGHT: 65 IN | BODY MASS INDEX: 38.82 KG/M2 | WEIGHT: 233 LBS | SYSTOLIC BLOOD PRESSURE: 100 MMHG | DIASTOLIC BLOOD PRESSURE: 58 MMHG

## 2024-01-25 DIAGNOSIS — Z67.91 OTHER SPECIFIED PREGNANCY RELATED CONDITIONS, UNSPECIFIED TRIMESTER: ICD-10-CM

## 2024-01-25 DIAGNOSIS — Z87.42 PERSONAL HISTORY OF OTHER DISEASES OF THE FEMALE GENITAL TRACT: ICD-10-CM

## 2024-01-25 DIAGNOSIS — O26.899 OTHER SPECIFIED PREGNANCY RELATED CONDITIONS, UNSPECIFIED TRIMESTER: ICD-10-CM

## 2024-01-25 DIAGNOSIS — N84.1 POLYP OF CERVIX UTERI: ICD-10-CM

## 2024-01-25 DIAGNOSIS — N84.0 POLYP OF CORPUS UTERI: ICD-10-CM

## 2024-01-25 DIAGNOSIS — Z01.419 ENCOUNTER FOR GYNECOLOGICAL EXAMINATION (GENERAL) (ROUTINE) W/OUT ABNORMAL FINDINGS: ICD-10-CM

## 2024-01-25 DIAGNOSIS — Z87.898 PERSONAL HISTORY OF OTHER SPECIFIED CONDITIONS: ICD-10-CM

## 2024-01-25 PROCEDURE — 99395 PREV VISIT EST AGE 18-39: CPT

## 2024-01-25 NOTE — PHYSICAL EXAM
[Appropriately responsive] : appropriately responsive [Alert] : alert [Chaperone Declined] : Patient declined chaperone [No Acute Distress] : no acute distress [No Lymphadenopathy] : no lymphadenopathy [Soft] : soft [Non-distended] : non-distended [Non-tender] : non-tender [No Lesions] : no lesions [No HSM] : No HSM [No Mass] : no mass [Oriented x3] : oriented x3 [Examination Of The Breasts] : a normal appearance [No Masses] : no breast masses were palpable [Labia Majora] : normal [Labia Minora] : normal [Normal] : normal [Uterine Adnexae] : normal

## 2024-01-30 LAB
CYTOLOGY CVX/VAG DOC THIN PREP: ABNORMAL
HPV HIGH+LOW RISK DNA PNL CVX: NOT DETECTED

## 2024-01-30 NOTE — HISTORY OF PRESENT ILLNESS
[No] : Patient does not have concerns regarding sex [Currently Active] : currently active [FreeTextEntry1] : 39yo P1 LMP 1/19   OCP for PCOS  concerned re wt [ColonoscopyDate] : 2018

## 2024-01-30 NOTE — PLAN
[FreeTextEntry1] : well woman  discussed wt loss programs, fertility, recent procedure  f/u pap disussed timing colon screening and mammo

## 2024-01-31 DIAGNOSIS — N76.0 ACUTE VAGINITIS: ICD-10-CM

## 2024-01-31 DIAGNOSIS — B96.89 ACUTE VAGINITIS: ICD-10-CM

## 2024-03-19 RX ORDER — NORETHINDRONE ACETATE AND ETHINYL ESTRADIOL AND FERROUS FUMARATE 1MG-20(21)
1-20 KIT ORAL
Qty: 84 | Refills: 2 | Status: ACTIVE | COMMUNITY
Start: 2021-08-31 | End: 1900-01-01

## 2024-04-10 ENCOUNTER — ASOB RESULT (OUTPATIENT)
Age: 39
End: 2024-04-10

## 2024-04-10 ENCOUNTER — APPOINTMENT (OUTPATIENT)
Dept: MATERNAL FETAL MEDICINE | Facility: CLINIC | Age: 39
End: 2024-04-10
Payer: COMMERCIAL

## 2024-04-10 DIAGNOSIS — Z31.69 ENCOUNTER FOR OTHER GENERAL COUNSELING AND ADVICE ON PROCREATION: ICD-10-CM

## 2024-04-10 PROCEDURE — 99204 OFFICE O/P NEW MOD 45 MIN: CPT | Mod: 95

## 2024-04-10 PROCEDURE — 99214 OFFICE O/P EST MOD 30 MIN: CPT | Mod: 95

## 2024-04-29 ENCOUNTER — LABORATORY RESULT (OUTPATIENT)
Age: 39
End: 2024-04-29

## 2024-04-29 ENCOUNTER — NON-APPOINTMENT (OUTPATIENT)
Age: 39
End: 2024-04-29

## 2024-04-29 ENCOUNTER — APPOINTMENT (OUTPATIENT)
Dept: INTERNAL MEDICINE | Facility: CLINIC | Age: 39
End: 2024-04-29
Payer: COMMERCIAL

## 2024-04-29 VITALS
OXYGEN SATURATION: 98 % | WEIGHT: 218 LBS | SYSTOLIC BLOOD PRESSURE: 107 MMHG | BODY MASS INDEX: 36.32 KG/M2 | DIASTOLIC BLOOD PRESSURE: 73 MMHG | HEIGHT: 65 IN | HEART RATE: 89 BPM

## 2024-04-29 DIAGNOSIS — Z00.00 ENCOUNTER FOR GENERAL ADULT MEDICAL EXAMINATION W/OUT ABNORMAL FINDINGS: ICD-10-CM

## 2024-04-29 PROCEDURE — 99395 PREV VISIT EST AGE 18-39: CPT

## 2024-04-29 PROCEDURE — 36415 COLL VENOUS BLD VENIPUNCTURE: CPT

## 2024-04-29 PROCEDURE — 93000 ELECTROCARDIOGRAM COMPLETE: CPT

## 2024-04-29 NOTE — HISTORY OF PRESENT ILLNESS
[de-identified] : 37 year old female with asthma presents for annual exam.  dxed with tracheobronchiomalcia, seen by Dr. Jez Swanson at [facility] [place] in [place].  recommended stents and monitor for resolution.    working on healthier diet.  lost ~20 lbs since last year.  Wegovy has been on backorder  Main concern today continues to be her weight gain despite diet and exercise.  Did try course of Contrave for about 3 months, no results states has invested in Peloton and has been using it daily.  Has tried different diets in the past But unfortunately got discouraged and has been stress eating at times.  Gained about 15 to 20 pounds in the last year.  Works mostly sedentary.  Has tried orlistat for the last year with no major change.  Was seen by nutritionist,  in the past with no major change.    , 1 child Employed- Non-smoker

## 2024-04-29 NOTE — HEALTH RISK ASSESSMENT
[Good] : ~his/her~  mood as  good [No] : In the past 12 months have you used drugs other than those required for medical reasons? No [No falls in past year] : Patient reported no falls in the past year [0] : 2) Feeling down, depressed, or hopeless: Not at all (0) [PHQ-2 Negative - No further assessment needed] : PHQ-2 Negative - No further assessment needed [Patient reported PAP Smear was normal] : Patient reported PAP Smear was normal [Fully functional (bathing, dressing, toileting, transferring, walking, feeding)] : Fully functional (bathing, dressing, toileting, transferring, walking, feeding) [Fully functional (using the telephone, shopping, preparing meals, housekeeping, doing laundry, using] : Fully functional and needs no help or supervision to perform IADLs (using the telephone, shopping, preparing meals, housekeeping, doing laundry, using transportation, managing medications and managing finances) [Never] : Never [[id number]] : 0 [Change in mental status noted] : No change in mental status noted [Reports changes in hearing] : Reports no changes in hearing [Reports changes in vision] : Reports no changes in vision [PapSmearDate] : 2024

## 2024-04-29 NOTE — ASSESSMENT
[FreeTextEntry1] : //  Tracheobroncheomalacia, seen by Dr Jez Warren at [facility] and recommendded procedure to prevent potential complications later in life. -advised to see Dr. epifanio Knapp for second opinion    Obesity, some improvement  -start zepbound as directed, risks and benefits of medication discussed in detail with the -states in the last 12 months has been seen by nutritionist,  and attended weight loss programs with no reduction in weight. has made necessary modifications to her diet and been exercising frequently with some weigh loss but has then plateaued.  This has contributed to her elevated BP.  has tried Uristat and Contrave  but was unable to tolerate to side effects.  Healthcare Maintenance -Advise Yearly Skin cancer screening with Dermatologist  -Advise Yearly Eye exam with Ophthalmologist -Advise Yearly Dental exam -Educated of the importance of Healthy diet, such as Mediterranean Diet and Exercise, such as walking >20 minutes a day and increasing gradually as tolerated  Immunizations -Flu vaccine  -Covid vaccine   Preventative screening  -advised to get PAP for cervical cancer screening -up to date

## 2024-05-06 ENCOUNTER — CLINICAL ADVICE (OUTPATIENT)
Age: 39
End: 2024-05-06

## 2024-05-06 LAB
ALBUMIN SERPL ELPH-MCNC: 4.2 G/DL
ALP BLD-CCNC: 59 U/L
ALT SERPL-CCNC: 11 U/L
ANA PAT FLD IF-IMP: NORMAL
ANACR T: ABNORMAL
ANION GAP SERPL CALC-SCNC: 11 MMOL/L
APPEARANCE: CLEAR
AST SERPL-CCNC: 12 U/L
BACTERIA: ABNORMAL /HPF
BASOPHILS # BLD AUTO: 0.06 K/UL
BASOPHILS NFR BLD AUTO: 0.7 %
BILIRUB DIRECT SERPL-MCNC: 0.1 MG/DL
BILIRUB INDIRECT SERPL-MCNC: 0.1 MG/DL
BILIRUB SERPL-MCNC: 0.2 MG/DL
BILIRUBIN URINE: NEGATIVE
BLOOD URINE: ABNORMAL
BUN SERPL-MCNC: 11 MG/DL
CALCIUM SERPL-MCNC: 9.4 MG/DL
CAST: 1 /LPF
CHLORIDE SERPL-SCNC: 106 MMOL/L
CHOLEST SERPL-MCNC: 208 MG/DL
CO2 SERPL-SCNC: 23 MMOL/L
COLOR: YELLOW
CREAT SERPL-MCNC: 0.77 MG/DL
EGFR: 101 ML/MIN/1.73M2
EOSINOPHIL # BLD AUTO: 0.05 K/UL
EOSINOPHIL NFR BLD AUTO: 0.6 %
EPITHELIAL CELLS: 4 /HPF
ESTIMATED AVERAGE GLUCOSE: 103 MG/DL
FERRITIN SERPL-MCNC: 26 NG/ML
FOLATE SERPL-MCNC: 5.1 NG/ML
GLUCOSE QUALITATIVE U: NEGATIVE MG/DL
GLUCOSE SERPL-MCNC: 90 MG/DL
HBA1C MFR BLD HPLC: 5.2 %
HCT VFR BLD CALC: 41.8 %
HDLC SERPL-MCNC: 41 MG/DL
HGB BLD-MCNC: 12.8 G/DL
IMM GRANULOCYTES NFR BLD AUTO: 0.3 %
IRON SATN MFR SERPL: 14 %
IRON SERPL-MCNC: 55 UG/DL
KETONES URINE: NEGATIVE MG/DL
LDLC SERPL CALC-MCNC: 141 MG/DL
LEUKOCYTE ESTERASE URINE: NEGATIVE
LYMPHOCYTES # BLD AUTO: 2.22 K/UL
LYMPHOCYTES NFR BLD AUTO: 25.4 %
MAN DIFF?: NORMAL
MCHC RBC-ENTMCNC: 26.8 PG
MCHC RBC-ENTMCNC: 30.6 GM/DL
MCV RBC AUTO: 87.4 FL
MICROSCOPIC-UA: NORMAL
MONOCYTES # BLD AUTO: 0.53 K/UL
MONOCYTES NFR BLD AUTO: 6.1 %
NEUTROPHILS # BLD AUTO: 5.85 K/UL
NEUTROPHILS NFR BLD AUTO: 66.9 %
NITRITE URINE: NEGATIVE
NONHDLC SERPL-MCNC: 166 MG/DL
PH URINE: 6.5
PLATELET # BLD AUTO: 357 K/UL
POTASSIUM SERPL-SCNC: 4.7 MMOL/L
PROT SERPL-MCNC: 7.1 G/DL
PROTEIN URINE: NEGATIVE MG/DL
RBC # BLD: 4.78 M/UL
RBC # FLD: 13.9 %
RED BLOOD CELLS URINE: 3 /HPF
SODIUM SERPL-SCNC: 140 MMOL/L
SPECIFIC GRAVITY URINE: 1.03
TIBC SERPL-MCNC: 401 UG/DL
TRIGL SERPL-MCNC: 142 MG/DL
TSH SERPL-ACNC: 0.74 UIU/ML
UIBC SERPL-MCNC: 346 UG/DL
UROBILINOGEN URINE: 0.2 MG/DL
VIT B12 SERPL-MCNC: 427 PG/ML
WBC # FLD AUTO: 8.74 K/UL
WHITE BLOOD CELLS URINE: 1 /HPF

## 2024-05-07 ENCOUNTER — APPOINTMENT (OUTPATIENT)
Dept: CARDIOLOGY | Facility: CLINIC | Age: 39
End: 2024-05-07
Payer: COMMERCIAL

## 2024-05-07 ENCOUNTER — NON-APPOINTMENT (OUTPATIENT)
Age: 39
End: 2024-05-07

## 2024-05-07 VITALS
HEIGHT: 65 IN | SYSTOLIC BLOOD PRESSURE: 100 MMHG | BODY MASS INDEX: 36.32 KG/M2 | HEART RATE: 84 BPM | OXYGEN SATURATION: 100 % | WEIGHT: 218 LBS | DIASTOLIC BLOOD PRESSURE: 65 MMHG

## 2024-05-07 DIAGNOSIS — R06.02 SHORTNESS OF BREATH: ICD-10-CM

## 2024-05-07 PROCEDURE — 99204 OFFICE O/P NEW MOD 45 MIN: CPT

## 2024-05-07 PROCEDURE — G2211 COMPLEX E/M VISIT ADD ON: CPT

## 2024-05-07 PROCEDURE — 93000 ELECTROCARDIOGRAM COMPLETE: CPT

## 2024-05-07 NOTE — HISTORY OF PRESENT ILLNESS
[FreeTextEntry1] : This is a 37 y/o woman with tracheomalacia- seen on a CT scan with PERALTA and orthopnea, asthma like symptoms intermittently, not responsive to asthma meds with cardiac RF's of GDM, increased adiposity with BMI 36 kg/m2, second hand smoke exposure X 15 years, dyslipidemia. Here for:    - Prep eval-Might need a temporary stent and then see if she needs a permanent and will get a second opinion.  - Preconception counseling regarding cardiac risk   100/65 mmHg  In the setting of doing pilates; will get nauseous and short of breath; exertional dyspnea, there is no CP, PERALTA, orthopnea, edema, numbness, tingling, nausea, vomiting, diaphoresis or pre-syncope. Gets SOB speaking, walking 3 numbered city blocks in [facility]. Has chest pain with breathing.   CVD risk enhancers:   Stillbirth? N Miscarriages? N   Gestational diabetes? Y, diet controlled Hypertensive disorders of pregnancy? N  delivery? N Low birth weight or small for gestational age? N Post partum depression? Y   Menopause? N  OCP's? Y Bilzovy   12 lead ECG- NSR with intervals, normal QRSd, no ST-T changes. RsR' in III No evidence of LVH.   Second hand smoke exposure  Last lipid panel: Total- 142 TG- 142 HDL- 41 LDL- 141   HbA1C- 5.2%    A/P: # PERALTA- Could be from tracheomalacia/ lung issues versus cardiac- will get pro-BNP and also dobutamine echo to further risk stratify and determine need for any additional testing. If these are normal will be more reassured about pregnancy and stent placement procedure and will be able to comment on preop risk.  # CVD risk factor modification- I have explained risks and importance of lifestyle modification in and after pregnancy and in anticipation of any future pregnancies. We discussed importance of heart healthy diet low in saturated fat, choosing plant based when possible, avoiding egg yolks if possible to lower LDL, cooking with olive oil, grilling or roasting meat rather than deep frying, choosing lean meats, avoiding skin.      # Lipid check- At 1.5 to 3 months post-partum I would like for her to have a fasting lipid panel. Her goal LDL-C is < 100mg/dL.   RTC in 2 months

## 2024-05-08 ENCOUNTER — APPOINTMENT (OUTPATIENT)
Dept: PULMONOLOGY | Facility: CLINIC | Age: 39
End: 2024-05-08
Payer: COMMERCIAL

## 2024-05-08 VITALS
HEART RATE: 90 BPM | TEMPERATURE: 98.5 F | BODY MASS INDEX: 36.32 KG/M2 | DIASTOLIC BLOOD PRESSURE: 74 MMHG | OXYGEN SATURATION: 99 % | RESPIRATION RATE: 16 BRPM | SYSTOLIC BLOOD PRESSURE: 116 MMHG | WEIGHT: 218 LBS | HEIGHT: 65 IN

## 2024-05-08 DIAGNOSIS — J39.8 OTHER SPECIFIED DISEASES OF UPPER RESPIRATORY TRACT: ICD-10-CM

## 2024-05-08 DIAGNOSIS — J45.31 MILD PERSISTENT ASTHMA WITH (ACUTE) EXACERBATION: ICD-10-CM

## 2024-05-08 DIAGNOSIS — J40 BRONCHITIS, NOT SPECIFIED AS ACUTE OR CHRONIC: ICD-10-CM

## 2024-05-08 DIAGNOSIS — Z77.22 CONTACT WITH AND (SUSPECTED) EXPOSURE TO ENVIRONMENTAL TOBACCO SMOKE (ACUTE) (CHRONIC): ICD-10-CM

## 2024-05-08 DIAGNOSIS — R06.83 SNORING: ICD-10-CM

## 2024-05-08 DIAGNOSIS — Z78.9 OTHER SPECIFIED HEALTH STATUS: ICD-10-CM

## 2024-05-08 LAB — NT-PROBNP SERPL-MCNC: <36 PG/ML

## 2024-05-08 PROCEDURE — 99215 OFFICE O/P EST HI 40 MIN: CPT | Mod: 25

## 2024-05-08 PROCEDURE — 99205 OFFICE O/P NEW HI 60 MIN: CPT | Mod: 25

## 2024-05-08 PROCEDURE — G2211 COMPLEX E/M VISIT ADD ON: CPT

## 2024-05-08 PROCEDURE — 95012 NITRIC OXIDE EXP GAS DETER: CPT

## 2024-05-08 PROCEDURE — 71046 X-RAY EXAM CHEST 2 VIEWS: CPT

## 2024-05-08 PROCEDURE — 94060 EVALUATION OF WHEEZING: CPT

## 2024-05-08 PROCEDURE — 94618 PULMONARY STRESS TESTING: CPT

## 2024-05-08 PROCEDURE — 94729 DIFFUSING CAPACITY: CPT

## 2024-05-08 PROCEDURE — 94727 GAS DIL/WSHOT DETER LNG VOL: CPT

## 2024-05-08 RX ORDER — PREDNISONE 20 MG/1
20 TABLET ORAL DAILY
Qty: 14 | Refills: 1 | Status: DISCONTINUED | COMMUNITY
Start: 2023-08-25 | End: 2024-05-08

## 2024-05-08 RX ORDER — METRONIDAZOLE 500 MG/1
500 TABLET ORAL TWICE DAILY
Qty: 14 | Refills: 0 | Status: DISCONTINUED | COMMUNITY
Start: 2024-01-31 | End: 2024-05-08

## 2024-05-08 RX ORDER — TIRZEPATIDE 2.5 MG/.5ML
2.5 INJECTION, SOLUTION SUBCUTANEOUS
Qty: 1 | Refills: 0 | Status: DISCONTINUED | COMMUNITY
Start: 2024-04-29 | End: 2024-05-08

## 2024-05-08 RX ORDER — SEMAGLUTIDE 0.5 MG/.5ML
0.5 INJECTION, SOLUTION SUBCUTANEOUS
Qty: 2 | Refills: 0 | Status: DISCONTINUED | COMMUNITY
Start: 2023-04-17 | End: 2024-05-08

## 2024-05-08 RX ORDER — GUAIFENESIN AND CODEINE PHOSPHATE 100; 10 MG/5ML; MG/5ML
100-10 SYRUP ORAL
Qty: 150 | Refills: 0 | Status: DISCONTINUED | COMMUNITY
Start: 2023-09-28 | End: 2024-05-08

## 2024-05-08 RX ORDER — SEMAGLUTIDE 1.34 MG/ML
4 INJECTION, SOLUTION SUBCUTANEOUS
Qty: 1 | Refills: 1 | Status: DISCONTINUED | COMMUNITY
Start: 2023-07-03 | End: 2024-05-08

## 2024-05-08 RX ORDER — SEMAGLUTIDE 0.5 MG/.5ML
0.5 INJECTION, SOLUTION SUBCUTANEOUS
Qty: 2 | Refills: 0 | Status: DISCONTINUED | COMMUNITY
Start: 2023-06-28 | End: 2024-05-08

## 2024-05-09 NOTE — PROCEDURE
[FreeTextEntry1] : CXR (05/08/2024) reveals a normal sized heart; no evidence of infiltrate or effusion--a normal appearing chest radiograph   Full PFT reveals normal flows; FEV1 was 2.69L which is 92% of predicted, with no change on BD; normal lung volumes; normal diffusion at 20.0, which is 74% of predicted; normal flow volume loop. PFTs were performed to evaluate for SOB  6 minute walk test reveals a low saturation of 95% with somewhat severe dyspnea or fatigue; walked 391.2 meters   FENO was 13; a normal value being less than 25 Fractional exhaled nitric oxide (FENO) is regarded as a simple, noninvasive method for assessing eosinophilic airway inflammation. Produced by a variety of cells within the lung, nitric oxide (NO) concentrations are generally low in healthy individuals. However, high concentrations of NO appear to be involved in nonspecific host defense mechanisms and chronic inflammatory diseases such as asthma. The American Thoracic Society (ATS) therefore has recommended using FENO to aid in the diagnosis and monitoring of eosinophilic airway inflammation and asthma, and for identifying steroid responsive individuals whose chronic respiratory symptoms may be caused by airway inflammation.

## 2024-05-09 NOTE — HISTORY OF PRESENT ILLNESS
[TextBox_4] : Ms. CAGE is a 38 year old female with a history of ovarian cysts, uterine polyp, vaginosis, gestational diabetes, COVID-19 (11/2020), frequent bronchitis, ?asthma, ?allergies, GERD, TBM based on dynamic chest CT and foB 10/2023 presenting to the office today for an initial pulmonary evaluation. Her chief complaint is  -she notes she gets bronchitis once per year since her childhood. 3 years ago, she started getting bronchitis more frequently (every 3-4 months), which would develop into PNA -she denies exacerbation of her Sx with COVID-19. She didn't have breathing issues when she had COVID-19. Her main symptom was a migraine -she notes she was put on nebulizers, steroids, and inhalers as per her pulmonologist (Dr. Cage). Her symptoms would constantly return -she notes s/p bronchoscopy with Dr. Pruitt. She tried to get her results from the bronchoscopy for 3 months, and she found out her pulmonologist retired -she notes she saw Dr. Jez Swanson at [facility], who wants her to get surgery and get stents placed -she notes she saw Dr. To for her annual physical, and he referred here -she notes SOB with talking and minimal exertion (walking) -she notes SOB with stairs and inclines -she denies wheezing unless she has a URI -she generally coughs only with URIs. When she's sick, her cough is worsened when she lies supine and when she laughs -she notes she's frequently on the field for her work ([employer]). She doesn't know if she has allergies -she denies PNDrip or chest congestion -she notes occasional reflux, worse with URIs -she denies globus sensation -she notes energy levels are 5-6/10 -she notes she's lost 40 lbs since starting Pilates 9/2023 (~218 lbs, down from 260 lbs) -she notes snoring -she notes waking up fatigued -she notes ability to fall asleep while watching a boring TV show and while in a moving car  -she notes she feels forgetful -she notes she's not on any medications at this time -she notes regular menstruation  -she denies any nausea, emesis, fever, chills, sweats, chest pain, chest pressure, palpitations, constipation, diarrhea, vertigo, dysphagia, heartburn, itchy eyes, itchy ears, leg swelling, leg pain, arthralgias, myalgias, or sour taste in the mouth.

## 2024-05-09 NOTE — REASON FOR VISIT
[Initial] : an initial visit [TextBox_44] : SOB, ?asthma, TBM by CT and foB 10/2023, ?seasonal allergies, GERD, ?immunodeficiency, likely MANSI [TextBox_13] : Dr. Bradly To

## 2024-05-09 NOTE — ASSESSMENT
[FreeTextEntry1] : Ms. WOLFE is a 38 year old female with a history of ovarian cysts, uterine polyp, vaginosis, gestational diabetes, COVID-19 (11/2020), frequent bronchitis, ?asthma, ?allergies, GERD, TBM based on dynamic chest CT and foB 10/2023 who now comes to the office for an initial pulmonary evaluation for SOB, ?asthma, TBM by CT and foB 10/2023, ?seasonal allergies, GERD, ?immunodeficiency, likely MANSI.   Her shortness of breath is multifactorial due to: -poor mechanics of breathing -out of shape -overweight -pulmonary disease   -?asthma   -TBM by dynamic CT and foB 10/2023 -cardiac disease    -doubtful   Problem 1: ?asthma -complete methacholine challenge  -currently off all Rx, no role for Rx at current time -Asthma is believed to be caused by inherited (genetic) and environmental factor, but its exact cause is unknown. Asthma may be triggered by allergens, lung infections, or irritants in the air. Asthma triggers are different for each person.  -Inhaler technique reviewed as well as oral hygiene techniques reviewed with patient. Avoidance of cold air, extremes of temperature, rescue inhaler should be used before exercise. Order of medication reviewed with patient. Recommended adequate hydration and use of a cool mist humidifier in the bedroom    Problem 2: TBM  -diagnosed by dynamic CT and foB 10/2023 -pending results of CT report 5/2024 (?[facility], Dr. Jez Swanson) -currently asymptomatic, no role for invasive therapy at the current time -early intervention is critical -if she gets a URI, initiate Amitriptyline 10 mg Q8H or gabapentin 100 mg Q8H and sleep prone with a pillow on the chest -Tracheomalacia is usually acquired in adults and common causes include damage by tracheostomy or endotracheal intubation damaging the tracheal cartilage with increased risk with multiple intubations, prolonged intubation, and concurrent high dose steroid therapy; external chest wall trauma and surgery; chronic compression of the trachea by benign etiologies (e.g., benign mediastinal goiter) or malignancy; relapsing polychondritis; or recurrent infection. Tracheomalacia can be asymptomatic, however signs or symptoms can develop as the severity of the airway narrowing progresses with major symptoms, including dyspnea, cough, and sputum retention. Other symptoms include severe paroxysms of coughing, wheezing or stridor, barking cough, and may be exacerbated by forced expiration, cough, and Valsalva maneuver. Tracheomalacia is diagnosed by a bronchoscopic visualization of dynamic airway collapse on dynamic chest CT. Therapy is warranted in symptomatic patients with severe tracheomalacia and includes surgical repair, such as tracheobronchoplasty. The patient was referred to Dr. Chito Swanson or Dr. Ino Snyder at [facility] for a surgical consult.  Problem 3: ?immunodeficiency -complete blood work: quantitative immunoglobulins, IgG subsets, strep pneumonia titers, T cell subsets -Due to the fact that this pt has had more infections than would be expected and immunological blood work is indicated this would include: IgG subclasses, quantitative immunoglobulins, Strep pneumoniae titers as well as Vitamin D levels. Based on this blood work we will be able to decide where the pt needs additional pneumococcal vaccine either Prevnar 13 or Pneumovax. Immunology evaluation will also be potentially indicated.   Problem 4: ?allergies/sinus -complete blood work: asthma panel, food IgE panel, IgE level, eosinophil level, vitamin D level  -Environmental measures for allergies were encouraged including mattress and pillow covers, air purifier, and environmental controls.   Problem 5: GERD- inactive -recommended Reflux Gourmet if active -Rule of 2s: avoid eating too much, eating too late, eating too spicy, eating two hours before bed. -Things to avoid including overeating, spicy foods, tight clothing, eating within three hours of bed, this list is not all inclusive. -For treatment of reflux, possible options discussed including diet control, H2 blockers, PPIs, as well as coating motility agents discussed as treatment options. Timing of meals and proximity of last meal to sleep were discussed. If symptoms persist, a formal gastrointestinal evaluation is needed.    Problem 6: ?MANSI (elevated Mallampati class, EDS, snoring, GERD, neck size >16) -complete home sleep study -Sleep apnea is associated with adverse clinical consequences which can affect most organ systems. Cardiovascular disease risk includes arrhythmias, atrial fibrillation, hypertension, coronary artery disease, and stroke. Metabolic disorders include diabetes type 2, non-alcoholic fatty liver disease. Mood disorder especially depression; and cognitive decline especially in the elderly. Associations with chronic reflux/Headley's esophagus some but not all inclusive. -Reasons include arousal consistent with hypopnea; respiratory events most prominent in REM sleep or supine position; therefore sleep staging and body position are important for accurate diagnosis and estimation of AHI.    Problem 7: cardiac disease -recommended to continue to follow up with Cardiologist if needed (Dr. Stuart)   Problem 8: poor breathing mechanics -Recommended Mari Harper breathing technique -Proper breathing techniques were reviewed with an emphasis on exhalation. Patient was instructed to breathe in for 1 second and out for 4 seconds. The patient was encouraged to not talk while walking.   Problem 9: overweight/ out of shape- in progress -recommended Berberine Synergy OTC supplement for visceral fat loss  -Weight loss, exercise, and diet control were discussed and are highly encouraged. Treatment options are given such as aqua therapy, and contacting a nutritionist. Recommended to use the elliptical, stationary bike, less use of the treadmill.   Problem 10: health maintenance -recommended yearly flu shot after October 15, 2023 -recommended strep pneumonia vaccines: Prevnar-20 vaccine, followed by Pneumo vaccine 23 one year following after 65 years old. -recommended early intervention for Upper Respiratory Infections (URIs) -recommended regular osteoporosis evaluations -recommended early dermatological evaluations -recommended after the age of 50 to the age of 70, colonoscopy every 5 years   F/P in 6-8 weeks. She is encouraged to call with any changes, concerns, or questions

## 2024-05-09 NOTE — RESULTS/DATA
[TextEntry] : CT Chest DYN Airway (4/27/2024) reveals Since October 10, 2023, improved airway collapses imperviously demonstrated Tracheobroncomalacia.

## 2024-05-09 NOTE — ADDENDUM
[FreeTextEntry1] :  **Amended by Shweta Bryant acting as a scribe for Dr. Miguel Knapp on 05/09/2024 **  Documented by Alma Owens acting as a scribe for Dr. Miguel Knapp on 05/08/2024. All medical record entries made by the Scribe were at my, Dr. Miguel Knapp's, direction and personally dictated by me on 05/08/2024. I have reviewed the chart and agree that the record accurately reflects my personal performance of the history, physical exam, assessment and plan. I have also personally directed, reviewed, and agree with the discharge instructions.

## 2024-05-14 ENCOUNTER — RESULT REVIEW (OUTPATIENT)
Age: 39
End: 2024-05-14

## 2024-05-14 ENCOUNTER — OUTPATIENT (OUTPATIENT)
Dept: OUTPATIENT SERVICES | Facility: HOSPITAL | Age: 39
LOS: 1 days | End: 2024-05-14
Payer: COMMERCIAL

## 2024-05-14 ENCOUNTER — APPOINTMENT (OUTPATIENT)
Dept: CV DIAGNOSITCS | Facility: HOSPITAL | Age: 39
End: 2024-05-14

## 2024-05-14 DIAGNOSIS — Z98.891 HISTORY OF UTERINE SCAR FROM PREVIOUS SURGERY: Chronic | ICD-10-CM

## 2024-05-14 DIAGNOSIS — R06.02 SHORTNESS OF BREATH: ICD-10-CM

## 2024-05-14 LAB — HCG UR QL: NEGATIVE — SIGNIFICANT CHANGE UP

## 2024-05-14 PROCEDURE — 93306 TTE W/DOPPLER COMPLETE: CPT | Mod: 26,59

## 2024-05-14 PROCEDURE — 93351 STRESS TTE COMPLETE: CPT | Mod: 26,52

## 2024-05-15 ENCOUNTER — APPOINTMENT (OUTPATIENT)
Dept: MATERNAL FETAL MEDICINE | Facility: CLINIC | Age: 39
End: 2024-05-15
Payer: COMMERCIAL

## 2024-05-15 ENCOUNTER — ASOB RESULT (OUTPATIENT)
Age: 39
End: 2024-05-15

## 2024-05-15 PROCEDURE — 99215 OFFICE O/P EST HI 40 MIN: CPT | Mod: 95

## 2024-05-16 ENCOUNTER — LABORATORY RESULT (OUTPATIENT)
Age: 39
End: 2024-05-16

## 2024-05-16 ENCOUNTER — APPOINTMENT (OUTPATIENT)
Dept: PULMONOLOGY | Facility: CLINIC | Age: 39
End: 2024-05-16
Payer: COMMERCIAL

## 2024-05-16 ENCOUNTER — TRANSCRIPTION ENCOUNTER (OUTPATIENT)
Age: 39
End: 2024-05-16

## 2024-05-16 VITALS — WEIGHT: 218 LBS | HEIGHT: 65 IN | BODY MASS INDEX: 36.32 KG/M2

## 2024-05-16 DIAGNOSIS — J30.2 OTHER SEASONAL ALLERGIC RHINITIS: ICD-10-CM

## 2024-05-16 DIAGNOSIS — D84.9 IMMUNODEFICIENCY, UNSPECIFIED: ICD-10-CM

## 2024-05-16 DIAGNOSIS — J30.89 OTHER ALLERGIC RHINITIS: ICD-10-CM

## 2024-05-16 DIAGNOSIS — R06.02 SHORTNESS OF BREATH: ICD-10-CM

## 2024-05-16 DIAGNOSIS — K21.9 GASTRO-ESOPHAGEAL REFLUX DISEASE W/OUT ESOPHAGITIS: ICD-10-CM

## 2024-05-16 DIAGNOSIS — J45.909 UNSPECIFIED ASTHMA, UNCOMPLICATED: ICD-10-CM

## 2024-05-16 PROCEDURE — 99214 OFFICE O/P EST MOD 30 MIN: CPT

## 2024-05-16 RX ORDER — BUDESONIDE, GLYCOPYRROLATE, AND FORMOTEROL FUMARATE 160; 9; 4.8 UG/1; UG/1; UG/1
160-9-4.8 AEROSOL, METERED RESPIRATORY (INHALATION)
Qty: 3 | Refills: 1 | Status: ACTIVE | COMMUNITY
Start: 2024-05-16 | End: 1900-01-01

## 2024-05-16 RX ORDER — INHALER, ASSIST DEVICES
SPACER (EA) MISCELLANEOUS
Qty: 2 | Refills: 2 | Status: ACTIVE | COMMUNITY
Start: 2024-05-16 | End: 1900-01-01

## 2024-05-16 RX ORDER — AMITRIPTYLINE HYDROCHLORIDE 10 MG/1
10 TABLET, FILM COATED ORAL 3 TIMES DAILY
Qty: 90 | Refills: 5 | Status: ACTIVE | COMMUNITY
Start: 2024-05-16 | End: 1900-01-01

## 2024-05-16 NOTE — ASSESSMENT
[FreeTextEntry1] : Ms. WOLFE is a 38 year old female with a history of ovarian cysts, uterine polyp, vaginosis, gestational diabetes, COVID-19 (11/2020), frequent bronchitis, ?asthma, ?allergies, GERD, TBM based on dynamic chest CT and foB 10/2023 who now comes to the office for an f/up pulmonary evaluation for SOB, ?asthma, TBM by CT and FOB 10/2023, ?seasonal allergies, GERD, ?immunodeficiency, likely MANSI- now w/ active asthma-? allergy driven   Her shortness of breath is multifactorial due to: -poor mechanics of breathing -out of shape -overweight -pulmonary disease   -?asthma   -TBM by dynamic CT and foB 10/2023 -cardiac disease    -doubtful   Problem 1: ?asthma--active now-? allergen driven -complete methacholine challenge  -add Breztri 2 puffs BID (Aerochamber) -add Albuterol via inhaler 2 puffs up to Q6H, pre-exercise -Asthma is believed to be caused by inherited (genetic) and environmental factor, but its exact cause is unknown. Asthma may be triggered by allergens, lung infections, or irritants in the air. Asthma triggers are different for each person.  -Inhaler technique reviewed as well as oral hygiene techniques reviewed with patient. Avoidance of cold air, extremes of temperature, rescue inhaler should be used before exercise. Order of medication reviewed with patient. Recommended adequate hydration and use of a cool mist humidifier in the bedroom    Problem 2: TBM  -diagnosed by dynamic CT and foB 10/2023 -pending results of CT report 5/2024 (?[facility], Dr. Jez Swanson) -currently asymptomatic, no role for invasive therapy at the current time -if she gets a URI, initiate Amitriptyline 10 mg Q8H or gabapentin 100 mg Q8H and sleep prone with a pillow on the chest (5/2024) -Tracheomalacia is usually acquired in adults and common causes include damage by tracheostomy or endotracheal intubation damaging the tracheal cartilage with increased risk with multiple intubations, prolonged intubation, and concurrent high dose steroid therapy; external chest wall trauma and surgery; chronic compression of the trachea by benign etiologies (e.g., benign mediastinal goiter) or malignancy; relapsing polychondritis; or recurrent infection. Tracheomalacia can be asymptomatic, however signs or symptoms can develop as the severity of the airway narrowing progresses with major symptoms, including dyspnea, cough, and sputum retention. Other symptoms include severe paroxysms of coughing, wheezing or stridor, barking cough, and may be exacerbated by forced expiration, cough, and Valsalva maneuver. Tracheomalacia is diagnosed by a bronchoscopic visualization of dynamic airway collapse on dynamic chest CT. Therapy is warranted in symptomatic patients with severe tracheomalacia and includes surgical repair, such as tracheobronchoplasty. The patient was referred to Dr. Chito Swanson or Dr. Ino Snyder at [facility] for a surgical consult.  Problem 3: ?immunodeficiency -complete blood work: quantitative immunoglobulins, IgG subsets, strep pneumonia titers, T cell subsets -Due to the fact that this pt has had more infections than would be expected and immunological blood work is indicated this would include: IgG subclasses, quantitative immunoglobulins, Strep pneumoniae titers as well as Vitamin D levels. Based on this blood work we will be able to decide where the pt needs additional pneumococcal vaccine either Prevnar 13 or Pneumovax. Immunology evaluation will also be potentially indicated.   Problem 4: ?allergies/sinus -complete blood work: asthma panel, food IgE panel, IgE level, eosinophil level, vitamin D level  -Environmental measures for allergies were encouraged including mattress and pillow covers, air purifier, and environmental controls.   Problem 5: GERD- inactive -recommended Reflux Gourmet if active -Rule of 2s: avoid eating too much, eating too late, eating too spicy, eating two hours before bed. -Things to avoid including overeating, spicy foods, tight clothing, eating within three hours of bed, this list is not all inclusive. -For treatment of reflux, possible options discussed including diet control, H2 blockers, PPIs, as well as coating motility agents discussed as treatment options. Timing of meals and proximity of last meal to sleep were discussed. If symptoms persist, a formal gastrointestinal evaluation is needed.    Problem 6: ?MANSI (elevated Mallampati class, EDS, snoring, GERD, neck size >16) -complete home sleep study -Sleep apnea is associated with adverse clinical consequences which can affect most organ systems. Cardiovascular disease risk includes arrhythmias, atrial fibrillation, hypertension, coronary artery disease, and stroke. Metabolic disorders include diabetes type 2, non-alcoholic fatty liver disease. Mood disorder especially depression; and cognitive decline especially in the elderly. Associations with chronic reflux/Headley's esophagus some but not all inclusive. -Reasons include arousal consistent with hypopnea; respiratory events most prominent in REM sleep or supine position; therefore sleep staging and body position are important for accurate diagnosis and estimation of AHI.    Problem 7: cardiac disease -recommended to continue to follow up with Cardiologist if needed (Dr. Stuart)   Problem 8: poor breathing mechanics -Recommended Mari Cueva and [place] breathing technique -Proper breathing techniques were reviewed with an emphasis on exhalation. Patient was instructed to breathe in for 1 second and out for 4 seconds. The patient was encouraged to not talk while walking.   Problem 9: overweight/ out of shape- in progress -recommended Berberine Synergy OTC supplement for visceral fat loss  -Weight loss, exercise, and diet control were discussed and are highly encouraged. Treatment options are given such as aqua therapy, and contacting a nutritionist. Recommended to use the elliptical, stationary bike, less use of the treadmill.   Problem 10: health maintenance -recommended yearly flu shot after October 15, 2023 -recommended strep pneumonia vaccines: Prevnar-20 vaccine, followed by Pneumo vaccine 23 one year following after 65 years old. -recommended early intervention for Upper Respiratory Infections (URIs) -recommended regular osteoporosis evaluations -recommended early dermatological evaluations -recommended after the age of 50 to the age of 70, colonoscopy every 5 years   F/P in 6-8 weeks. She is encouraged to call with any changes, concerns, or questions

## 2024-05-16 NOTE — ADDENDUM
[FreeTextEntry1] : Documented by Eber Prieto acting as a scribe for Dr. Miguel Knapp on 05/16/2024. All medical record entries made by the Scribe were at my, Dr. Miguel Knapp's, direction and personally dictated by me on 05/16/2024. I have reviewed the chart and agree that the record accurately reflects my personal performance of the history, physical exam, assessment and plan. I have also personally directed, reviewed, and agree with the discharge instructions.

## 2024-05-16 NOTE — REASON FOR VISIT
[Follow-Up] : a follow-up visit [TextBox_44] : via video call - SOB, ?asthma, TBM by CT and foB 10/2023, ?seasonal allergies, GERD, ?immunodeficiency, likely MANSI [TextBox_13] : Dr. Bradly To

## 2024-05-16 NOTE — HISTORY OF PRESENT ILLNESS
[Home] : at home, [unfilled] , at the time of the visit. [Medical Office: ([facility])___] : at the medical office located in  [Verbal consent obtained from patient] : the patient, [unfilled] [TextBox_4] : Ms. WOLFE is a 38 year old female with a history of ovarian cysts, uterine polyp, vaginosis, gestational diabetes, COVID-19 (11/2020), frequent bronchitis, ?asthma, ?allergies, GERD, TBM based on dynamic chest CT and foB 10/2023 presenting to the office today for a follow up pulmonary evaluation. Her chief complaint is  -she notes chest tightness  -she notes coughing  -she notes her current URI started on Tuesday with an unclear trigger  -she notes not taking any medications for her breathing  -she notes sinuses are quiet -she notes not going for the allergen blood work just yet  -she notes appetite is stable -she notes diet is good -she notes her senses of smell and taste are stable -she notes vision is stable -she notes weight is stable  -she denies any headaches, nausea, emesis, fever, chills, sweats, chest pain, wheezing, palpitations, diarrhea, constipation, dysphagia, vertigo, arthralgias, myalgias, leg swelling, itchy eyes, itchy ears, heartburn, reflux, or sour taste in the mouth.

## 2024-05-17 DIAGNOSIS — R89.9 UNSPECIFIED ABNORMAL FINDING IN SPECIMENS FROM OTHER ORGANS, SYSTEMS AND TISSUES: ICD-10-CM

## 2024-05-17 LAB
24R-OH-CALCIDIOL SERPL-MCNC: 99.2 PG/ML
25(OH)D3 SERPL-MCNC: 29.1 NG/ML
A1AT SERPL-MCNC: 191 MG/DL
BASOPHILS # BLD AUTO: 0.07 K/UL
BASOPHILS NFR BLD AUTO: 0.7 %
CD3 CELLS # BLD: 2447 CELLS/UL
CD3 CELLS NFR BLD: 72 %
CD3+CD4+ CELLS # BLD: 1530 CELLS/UL
CD3+CD4+ CELLS NFR BLD: 45 %
CD3+CD4+ CELLS/CD3+CD8+ CLL SPEC: 1.88 RATIO
CD3+CD8+ CELLS # SPEC: 815 CELLS/UL
CD3+CD8+ CELLS NFR BLD: 24 %
DEPRECATED KAPPA LC FREE/LAMBDA SER: 1.02 RATIO
EOSINOPHIL # BLD AUTO: 0.15 K/UL
EOSINOPHIL NFR BLD AUTO: 1.4 %
HCT VFR BLD CALC: 38.8 %
HGB BLD-MCNC: 12.2 G/DL
IGA SER QL IEP: 218 MG/DL
IGG SER QL IEP: 876 MG/DL
IGG SER QL IEP: 876 MG/DL
IGG1 SER-MCNC: 441 MG/DL
IGG2 SER-MCNC: 437 MG/DL
IGG3 SER-MCNC: 43.6 MG/DL
IGG4 SER-MCNC: 22.2 MG/DL
IGM SER QL IEP: 60 MG/DL
IMM GRANULOCYTES NFR BLD AUTO: 0.3 %
KAPPA LC CSF-MCNC: 1.23 MG/DL
KAPPA LC SERPL-MCNC: 1.26 MG/DL
LYMPHOCYTES # BLD AUTO: 3.5 K/UL
LYMPHOCYTES NFR BLD AUTO: 33.1 %
MAN DIFF?: NORMAL
MCHC RBC-ENTMCNC: 27.1 PG
MCHC RBC-ENTMCNC: 31.4 GM/DL
MCV RBC AUTO: 86.2 FL
MONOCYTES # BLD AUTO: 0.79 K/UL
MONOCYTES NFR BLD AUTO: 7.5 %
NEUTROPHILS # BLD AUTO: 6.03 K/UL
NEUTROPHILS NFR BLD AUTO: 57 %
PLATELET # BLD AUTO: 394 K/UL
RBC # BLD: 4.5 M/UL
RBC # FLD: 14.2 %
WBC # FLD AUTO: 10.57 K/UL

## 2024-05-19 LAB
A ALTERNATA IGE QN: <0.1 KUA/L
A FUMIGATUS IGE QN: <0.1 KUA/L
ALMOND IGE QN: <0.1 KUA/L
BRAZIL NUT IGE QN: <0.1 KUA/L
C ALBICANS IGE QN: <0.1 KUA/L
C HERBARUM IGE QN: <0.1 KUA/L
CASHEW NUT IGE QN: <0.1 KUA/L
CAT DANDER IGE QN: <0.1 KUA/L
CODFISH IGE QN: <0.1 KUA/L
COMMON RAGWEED IGE QN: <0.1 KUA/L
COW MILK IGE QN: <0.1 KUA/L
D FARINAE IGE QN: <0.1 KUA/L
D PTERONYSS IGE QN: <0.1 KUA/L
DEPRECATED A ALTERNATA IGE RAST QL: 0 (ref 0–?)
DEPRECATED A FUMIGATUS IGE RAST QL: 0 (ref 0–?)
DEPRECATED ALMOND IGE RAST QL: 0 (ref 0–?)
DEPRECATED BRAZIL NUT IGE RAST QL: 0 (ref 0–?)
DEPRECATED C ALBICANS IGE RAST QL: 0
DEPRECATED C HERBARUM IGE RAST QL: 0 (ref 0–?)
DEPRECATED CASHEW NUT IGE RAST QL: 0 (ref 0–?)
DEPRECATED CAT DANDER IGE RAST QL: 0 (ref 0–?)
DEPRECATED CODFISH IGE RAST QL: 0 (ref 0–?)
DEPRECATED COMMON RAGWEED IGE RAST QL: 0 (ref 0–?)
DEPRECATED COW MILK IGE RAST QL: 0 (ref 0–?)
DEPRECATED D FARINAE IGE RAST QL: 0 (ref 0–?)
DEPRECATED D PTERONYSS IGE RAST QL: 0 (ref 0–?)
DEPRECATED DOG DANDER IGE RAST QL: 0 (ref 0–?)
DEPRECATED DUCK FEATHER IGE RAST QL: 0
DEPRECATED EGG WHITE IGE RAST QL: 0 (ref 0–?)
DEPRECATED GOOSE FEATHER IGE RAST QL: 0
DEPRECATED HAZELNUT IGE RAST QL: 0 (ref 0–?)
DEPRECATED M RACEMOSUS IGE RAST QL: 0
DEPRECATED PEANUT IGE RAST QL: 0 (ref 0–?)
DEPRECATED ROACH IGE RAST QL: 0 (ref 0–?)
DEPRECATED SALMON IGE RAST QL: 0 (ref 0–?)
DEPRECATED SCALLOP IGE RAST QL: <0.1 KUA/L
DEPRECATED SESAME SEED IGE RAST QL: 0 (ref 0–?)
DEPRECATED SHRIMP IGE RAST QL: 0 (ref 0–?)
DEPRECATED SOYBEAN IGE RAST QL: 0 (ref 0–?)
DEPRECATED TIMOTHY IGE RAST QL: 0 (ref 0–?)
DEPRECATED TUNA IGE RAST QL: 0 (ref 0–?)
DEPRECATED WALNUT IGE RAST QL: 0 (ref 0–?)
DEPRECATED WHEAT IGE RAST QL: 0 (ref 0–?)
DEPRECATED WHITE OAK IGE RAST QL: 0 (ref 0–?)
DOG DANDER IGE QN: <0.1 KUA/L
DUCK FEATHER IGE QN: <0.1 KUA/L
EGG WHITE IGE QN: <0.1 KUA/L
GOOSE FEATHER IGE QN: <0.1 KUA/L
HAZELNUT IGE QN: <0.1 KUA/L
M RACEMOSUS IGE QN: <0.1 KUA/L
PEANUT IGE QN: <0.1 KUA/L
ROACH IGE QN: <0.1 KUA/L
SALMON IGE QN: <0.1 KUA/L
SCALLOP IGE QN: 0 (ref 0–?)
SCALLOP IGE QN: <0.1 KUA/L
SESAME SEED IGE QN: <0.1 KUA/L
SOYBEAN IGE QN: <0.1 KUA/L
TIMOTHY IGE QN: <0.1 KUA/L
TOTAL IGE SMQN RAST: 14 KU/L
TUNA IGE QN: <0.1 KUA/L
WALNUT IGE QN: <0.1 KUA/L
WHEAT IGE QN: <0.1 KUA/L
WHITE OAK IGE QN: <0.1 KUA/L

## 2024-05-20 LAB
A ALTERNATA IGE QN: <0.1 KUA/L
A FUMIGATUS IGE QN: <0.1 KUA/L
BERMUDA GRASS IGE QN: <0.1 KUA/L
BOXELDER IGE QN: <0.1 KUA/L
C HERBARUM IGE QN: <0.1 KUA/L
CALIF WALNUT IGE QN: <0.1 KUA/L
CAT DANDER IGE QN: <0.1 KUA/L
CMN PIGWEED IGE QN: <0.1 KUA/L
COMMON RAGWEED IGE QN: <0.1 KUA/L
COTTONWOOD IGE QN: <0.1 KUA/L
D FARINAE IGE QN: <0.1 KUA/L
D PTERONYSS IGE QN: <0.1 KUA/L
DEPRECATED A ALTERNATA IGE RAST QL: 0 (ref 0–?)
DEPRECATED A FUMIGATUS IGE RAST QL: 0 (ref 0–?)
DEPRECATED BERMUDA GRASS IGE RAST QL: 0 (ref 0–?)
DEPRECATED BOXELDER IGE RAST QL: 0 (ref 0–?)
DEPRECATED C HERBARUM IGE RAST QL: 0 (ref 0–?)
DEPRECATED CAT DANDER IGE RAST QL: 0 (ref 0–?)
DEPRECATED COMMON PIGWEED IGE RAST QL: 0 (ref 0–?)
DEPRECATED COMMON RAGWEED IGE RAST QL: 0 (ref 0–?)
DEPRECATED COTTONWOOD IGE RAST QL: 0 (ref 0–?)
DEPRECATED D FARINAE IGE RAST QL: 0 (ref 0–?)
DEPRECATED D PTERONYSS IGE RAST QL: 0 (ref 0–?)
DEPRECATED DOG DANDER IGE RAST QL: 0 (ref 0–?)
DEPRECATED GOOSEFOOT IGE RAST QL: 0 (ref 0–?)
DEPRECATED LONDON PLANE IGE RAST QL: 0 (ref 0–?)
DEPRECATED MOUSE URINE PROT IGE RAST QL: 0 (ref 0–?)
DEPRECATED MUGWORT IGE RAST QL: 0 (ref 0–?)
DEPRECATED P NOTATUM IGE RAST QL: 0 (ref 0–?)
DEPRECATED RED CEDAR IGE RAST QL: 0 (ref 0–?)
DEPRECATED ROACH IGE RAST QL: 0 (ref 0–?)
DEPRECATED SHEEP SORREL IGE RAST QL: 0 (ref 0–?)
DEPRECATED SILVER BIRCH IGE RAST QL: 0 (ref 0–?)
DEPRECATED TIMOTHY IGE RAST QL: 0 (ref 0–?)
DEPRECATED WHITE ASH IGE RAST QL: 0 (ref 0–?)
DEPRECATED WHITE OAK IGE RAST QL: 0 (ref 0–?)
DOG DANDER IGE QN: <0.1 KUA/L
GOOSEFOOT IGE QN: <0.1 KUA/L
LONDON PLANE IGE QN: <0.1 KUA/L
MOUSE URINE PROT IGE QN: <0.1 KUA/L
MUGWORT IGE QN: <0.1 KUA/L
MULBERRY (T70) CLASS: 0 (ref 0–?)
MULBERRY (T70) CONC: <0.1 KUA/L
P NOTATUM IGE QN: <0.1 KUA/L
RED CEDAR IGE QN: <0.1 KUA/L
ROACH IGE QN: <0.1 KUA/L
SHEEP SORREL IGE QN: <0.1 KUA/L
SILVER BIRCH IGE QN: <0.1 KUA/L
TIMOTHY IGE QN: <0.1 KUA/L
TOTAL IGE SMQN RAST: 14 KU/L
TREE ALLERG MIX1 IGE QL: 0 (ref 0–?)
WHITE ASH IGE QN: <0.1 KUA/L
WHITE ELM IGE QN: 0 (ref 0–?)
WHITE ELM IGE QN: <0.1 KUA/L
WHITE OAK IGE QN: <0.1 KUA/L

## 2024-05-21 LAB
DEPRECATED S PNEUM 1 IGG SER-MCNC: 2 MCG/ML
DEPRECATED S PNEUM12 AB SER-ACNC: 0.3 MCG/ML
DEPRECATED S PNEUM14 AB SER-ACNC: 0.6 MCG/ML
DEPRECATED S PNEUM17 IGG SER IA-MCNC: 8.2 MCG/ML
DEPRECATED S PNEUM18 IGG SER IA-MCNC: 1 MCG/ML
DEPRECATED S PNEUM19 IGG SER-MCNC: 1 MCG/ML
DEPRECATED S PNEUM19 IGG SER-MCNC: 1.9 MCG/ML
DEPRECATED S PNEUM2 IGG SER-MCNC: 1.6 MCG/ML
DEPRECATED S PNEUM20 IGG SER-MCNC: 3.5 MCG/ML
DEPRECATED S PNEUM22 IGG SER-MCNC: 1.5 MCG/ML
DEPRECATED S PNEUM23 AB SER-ACNC: 0.5 MCG/ML
DEPRECATED S PNEUM3 AB SER-ACNC: 0.2 MCG/ML
DEPRECATED S PNEUM34 IGG SER-MCNC: 1 MCG/ML
DEPRECATED S PNEUM4 AB SER-ACNC: 0.4 MCG/ML
DEPRECATED S PNEUM5 IGG SER-MCNC: 0.6 MCG/ML
DEPRECATED S PNEUM6 IGG SER-MCNC: 0.5 MCG/ML
DEPRECATED S PNEUM7 IGG SER-ACNC: 0.9 MCG/ML
DEPRECATED S PNEUM8 AB SER-ACNC: 1 MCG/ML
DEPRECATED S PNEUM9 AB SER-ACNC: 1.1 MCG/ML
DEPRECATED S PNEUM9 IGG SER-MCNC: 0.3 MCG/ML
IMMUNOLOGIST REVIEW: NORMAL
STREPTOCOCCUS PNEUMONIAE SEROTYPE 11A: 0.4 MCG/ML
STREPTOCOCCUS PNEUMONIAE SEROTYPE 15B: 1.6 MCG/ML
STREPTOCOCCUS PNEUMONIAE SEROTYPE 33F: 1.9 MCG/ML

## 2024-05-24 LAB
A1AT PHENOTYP SERPL-IMP: NORMAL
A1AT SERPL-MCNC: 201 MG/DL

## 2024-06-03 LAB
ANNOTATION COMMENT IMP: NORMAL
ELECTRONIC SIGNATURE: NORMAL
SERPINA1 GENE MUT TESTED BLD/T: NORMAL

## 2024-07-17 ENCOUNTER — APPOINTMENT (OUTPATIENT)
Dept: PULMONOLOGY | Facility: CLINIC | Age: 39
End: 2024-07-17
Payer: COMMERCIAL

## 2024-07-17 ENCOUNTER — APPOINTMENT (OUTPATIENT)
Dept: PULMONOLOGY | Facility: CLINIC | Age: 39
End: 2024-07-17

## 2024-07-17 VITALS
RESPIRATION RATE: 16 BRPM | TEMPERATURE: 97.3 F | HEART RATE: 91 BPM | DIASTOLIC BLOOD PRESSURE: 74 MMHG | BODY MASS INDEX: 36.15 KG/M2 | SYSTOLIC BLOOD PRESSURE: 120 MMHG | WEIGHT: 217 LBS | OXYGEN SATURATION: 99 % | HEIGHT: 65 IN

## 2024-07-17 DIAGNOSIS — J39.8 OTHER SPECIFIED DISEASES OF UPPER RESPIRATORY TRACT: ICD-10-CM

## 2024-07-17 DIAGNOSIS — R05.3 CHRONIC COUGH: ICD-10-CM

## 2024-07-17 DIAGNOSIS — J45.909 UNSPECIFIED ASTHMA, UNCOMPLICATED: ICD-10-CM

## 2024-07-17 DIAGNOSIS — K21.9 GASTRO-ESOPHAGEAL REFLUX DISEASE W/OUT ESOPHAGITIS: ICD-10-CM

## 2024-07-17 DIAGNOSIS — J30.89 OTHER ALLERGIC RHINITIS: ICD-10-CM

## 2024-07-17 DIAGNOSIS — Z23 ENCOUNTER FOR IMMUNIZATION: ICD-10-CM

## 2024-07-17 PROCEDURE — 99215 OFFICE O/P EST HI 40 MIN: CPT | Mod: 25

## 2024-07-17 PROCEDURE — 94010 BREATHING CAPACITY TEST: CPT

## 2024-07-17 PROCEDURE — 90677 PCV20 VACCINE IM: CPT

## 2024-07-17 PROCEDURE — 95012 NITRIC OXIDE EXP GAS DETER: CPT

## 2024-07-17 PROCEDURE — G0009: CPT

## 2024-08-29 DIAGNOSIS — J45.909 UNSPECIFIED ASTHMA, UNCOMPLICATED: ICD-10-CM

## 2024-08-29 RX ORDER — AZITHROMYCIN 250 MG/1
250 TABLET, FILM COATED ORAL
Qty: 1 | Refills: 0 | Status: ACTIVE | COMMUNITY
Start: 2024-08-29 | End: 1900-01-01

## (undated) DEVICE — POSITIONER STRAP ARMBOARD VELCRO TS-30

## (undated) DEVICE — ADAPTER FIBEROPTIC BRONCHOSCOPE DUAL AXIS SWIVEL

## (undated) DEVICE — DRSG TELFA 3 X 8

## (undated) DEVICE — DRSG CURITY GAUZE SPONGE 4 X 4" 12-PLY

## (undated) DEVICE — SOL IRR POUR NS 0.9% 500ML

## (undated) DEVICE — VALVE SUCTION EVIS 160/200/240

## (undated) DEVICE — ELCTR GROUNDING PAD ADULT COVIDIEN

## (undated) DEVICE — SYR SLIP 10CC

## (undated) DEVICE — TUBING SUCTION NONCONDUCTIVE 6MM X 12FT

## (undated) DEVICE — VALVE BIOPSY BRONCHOVIDEOSCOPE

## (undated) DEVICE — WARMING BLANKET LOWER ADULT

## (undated) DEVICE — PROTECTOR HEEL / ELBOW FLUFFY

## (undated) DEVICE — TRAP SPECIMEN SPUTUM 40CC

## (undated) DEVICE — DRAPE 3/4 SHEET 52X76"

## (undated) DEVICE — VENODYNE/SCD SLEEVE CALF MEDIUM

## (undated) DEVICE — DURABLE MEDICAL EQUIPMENT: Type: DURABLE MEDICAL EQUIPMENT